# Patient Record
Sex: FEMALE | Race: WHITE | NOT HISPANIC OR LATINO | Employment: FULL TIME | URBAN - METROPOLITAN AREA
[De-identification: names, ages, dates, MRNs, and addresses within clinical notes are randomized per-mention and may not be internally consistent; named-entity substitution may affect disease eponyms.]

---

## 2017-01-12 RX ORDER — LEVOTHYROXINE SODIUM 0.07 MG/1
TABLET ORAL
Qty: 90 TAB | Refills: 0 | Status: SHIPPED | OUTPATIENT
Start: 2017-01-12 | End: 2017-03-13 | Stop reason: SDUPTHER

## 2017-01-12 RX ORDER — SIMVASTATIN 10 MG
TABLET ORAL
Qty: 90 TAB | Refills: 0 | Status: SHIPPED | OUTPATIENT
Start: 2017-01-12 | End: 2017-03-13 | Stop reason: SDUPTHER

## 2017-01-13 NOTE — TELEPHONE ENCOUNTER
Was the patient seen in the last year in this department? Yes     Does patient have an active prescription for medications requested? No     Received Request Via: Pharmacy      Pt met protocol?: Yes, OV 12/16, TSH 11/16 0.220, LIPID LABS 11/16

## 2017-01-13 NOTE — TELEPHONE ENCOUNTER
Last seen by PCP 12/16. Labs 11/16. TSH 0.220. Retesting in 3 months. Will send 3 months to pharmacy.

## 2017-02-07 RX ORDER — AMLODIPINE BESYLATE 5 MG/1
TABLET ORAL
Qty: 90 TAB | Refills: 1 | Status: SHIPPED | OUTPATIENT
Start: 2017-02-07 | End: 2017-08-08 | Stop reason: SDUPTHER

## 2017-02-07 NOTE — TELEPHONE ENCOUNTER
Refill X 6 months, sent to pharmacy.Pt. Seen in the last 6 months per protocol.   Lab Results   Component Value Date/Time    SODIUM 137 11/21/2016 10:12 AM    POTASSIUM 3.7 11/21/2016 10:12 AM    CHLORIDE 105 11/21/2016 10:12 AM    CO2 26 11/21/2016 10:12 AM    GLUCOSE 76 11/21/2016 10:12 AM    BUN 12 11/21/2016 10:12 AM    CREATININE 0.85 11/21/2016 10:12 AM

## 2017-02-07 NOTE — TELEPHONE ENCOUNTER
Was the patient seen in the last year in this department? Yes     Does patient have an active prescription for medications requested? No     Received Request Via: Pharmacy      Pt met protocol?: Yes pt last ov 12/2016   BP Readings from Last 1 Encounters:   12/10/16 120/78

## 2017-03-08 ENCOUNTER — OFFICE VISIT (OUTPATIENT)
Dept: MEDICAL GROUP | Facility: PHYSICIAN GROUP | Age: 52
End: 2017-03-08
Payer: COMMERCIAL

## 2017-03-08 VITALS
TEMPERATURE: 98.2 F | RESPIRATION RATE: 16 BRPM | WEIGHT: 155.6 LBS | BODY MASS INDEX: 31.37 KG/M2 | HEIGHT: 59 IN | HEART RATE: 72 BPM | SYSTOLIC BLOOD PRESSURE: 126 MMHG | OXYGEN SATURATION: 99 % | DIASTOLIC BLOOD PRESSURE: 80 MMHG

## 2017-03-08 DIAGNOSIS — Z12.31 ENCOUNTER FOR SCREENING MAMMOGRAM FOR BREAST CANCER: ICD-10-CM

## 2017-03-08 DIAGNOSIS — E66.9 OBESITY (BMI 35.0-39.9 WITHOUT COMORBIDITY): ICD-10-CM

## 2017-03-08 DIAGNOSIS — B00.1 HERPES LABIALIS: ICD-10-CM

## 2017-03-08 DIAGNOSIS — M25.441 SWELLING OF FINGER JOINT OF RIGHT HAND: ICD-10-CM

## 2017-03-08 PROCEDURE — 99214 OFFICE O/P EST MOD 30 MIN: CPT | Performed by: NURSE PRACTITIONER

## 2017-03-08 RX ORDER — FAMCICLOVIR 500 MG/1
500 TABLET ORAL 2 TIMES DAILY
Qty: 60 TAB | Refills: 5 | Status: SHIPPED | OUTPATIENT
Start: 2017-03-08

## 2017-03-08 NOTE — ASSESSMENT & PLAN NOTE
Patient reports swelling of the finger joint, right hand fifth digit. She had a pressure injury many years ago and developed arthritis. Last year she received a cortisone injection into the PIP joint and is requesting a cortisone injection again.

## 2017-03-08 NOTE — ASSESSMENT & PLAN NOTE
Patient reports having herpes labialis for many years. She was taking Famvir last breakout was 5 years ago. She had a recent breakout one week ago and is requesting refill on medication.

## 2017-03-08 NOTE — PATIENT INSTRUCTIONS
For adults, the American Heart Association recommends:    Consume a dietary pattern that emphasizes intake of vegetables, fruits, and whole grains; includes low-fat dairy products, poultry, fish, legumes, nontropical vegetable oils and nuts; and limits intake of sweets, sugar-sweetened beverages and red meats.  Look up DASH diet for more information.    Aim for a dietary pattern that achieves 5% to 6% of calories from saturated fat.     Reduce/eliminate percent of calories from trans fat     Consume no more than 2,400 mg of sodium/day     Engage in aerobic physical activity, 3 to 4 sessions a week, lasting on average 40 minutes per session, and involving moderate-to-vigorous intensity physical activity.

## 2017-03-08 NOTE — MR AVS SNAPSHOT
"        Drake Blakely   3/8/2017 10:20 AM   Office Visit   MRN: [id number]    Department:  [facility]   Dept Phone:  [phone]    Description:  Female : 1965   Provider:  JOSH Bach           Reason for Visit     Finger Pain Rt Pinky finger     Medication Refill Famvir       Allergies as of 3/8/2017     Allergen Noted Reactions    Erythromycin 2010   Swelling    Penicillins 2007   Hives, Rash, Swelling    Valtrex [Valacyclovir] 2016   Vomiting    Nausea, vomiting, and abd pain    Vicodin [Hydrocodone-Acetaminophen] 2014   Vomiting    Morphine 10/15/2009   Itching    Naprosyn [Naproxen] 10/15/2009   Nausea    Percocet [Oxycodone-Acetaminophen] 2011   Vomiting      You were diagnosed with     Obesity (BMI 35.0-39.9 without comorbidity) ([facility])   [852662]       Swelling of finger joint of right hand   [5117972]       Encounter for screening mammogram for breast cancer   [4029435]       Herpes labialis   [736526]         Vital Signs     Blood Pressure Pulse Temperature Respirations Height Weight    126/80 mmHg 72 36.8 °C (98.2 °F) 16 1.499 m (4' 11.02\") 70.58 kg (155 lb 9.6 oz)    Body Mass Index Oxygen Saturation Smoking Status             31.41 kg/m2 99% Former Smoker         Basic Information     Date Of Birth Sex Race Ethnicity Preferred Language    1965 Female White Non- English      Your appointments     Mar 13, 2017  9:50 AM   MA SCRN10 with [facility] MG 2   [facility] ([address])    [address] [address]   [phone]           No deodorant, powder, perfume or lotion under the arm or breast area.              Problem List              ICD-10-CM Priority Class Noted - Resolved    Allergic rhinitis J30.9   10/15/2009 - Present    HTN (hypertension) I10   10/15/2009 - Present    Seasonal allergies J30.2   Unknown - Present    CHEST PAIN    2013 - Present    Hypertension I10   " 8/23/2013 - Present    Hypothyroid E03.9   8/23/2013 - Present    Hyperlipidemia E78.5   8/23/2013 - Present    Mild intermittent asthma without complication J45.20   9/1/2016 - Present    Obesity (BMI 30-39.9) E66.9   9/1/2016 - Present    Insomnia G47.00   9/12/2016 - Present    Anxiety (Chronic) F41.9   9/1/2016 - Present    ROHAN (obstructive sleep apnea) G47.33   9/27/2016 - Present    Vitamin D deficiency E55.9   12/6/2016 - Present    Obesity (BMI 35.0-39.9 without comorbidity) (HCC) E66.9   3/8/2017 - Present    Swelling of finger joint of right hand M25.441   3/8/2017 - Present    Herpes labialis B00.1   3/8/2017 - Present      Health Maintenance        Date Due Completion Dates    PAP SMEAR 1/10/1986 ---    COLONOSCOPY 1/10/2015 ---    MAMMOGRAM 9/22/2015 9/22/2014, 9/16/2013, 4/26/2012, 3/14/2011, 3/20/2009, 3/20/2009, 3/19/2008, 3/19/2008, 2/5/2007    IMM INFLUENZA (1) 9/1/2016 ---    IMM DTaP/Tdap/Td Vaccine (3 - Td) 11/21/2026 11/21/2016, 10/25/2010            Current Immunizations     Pneumococcal polysaccharide vaccine (PPSV-23) 11/21/2016 10:33 AM    Tdap Vaccine 11/21/2016 10:31 AM, 10/25/2010      Below and/or attached are the medications your provider expects you to take. Review all of your home medications and newly ordered medications with your provider and/or pharmacist. Follow medication instructions as directed by your provider and/or pharmacist. Please keep your medication list with you and share with your provider. Update the information when medications are discontinued, doses are changed, or new medications (including over-the-counter products) are added; and carry medication information at all times in the event of emergency situations     Allergies:  ERYTHROMYCIN - Swelling     PENICILLINS - Hives,Rash,Swelling     VALTREX - Vomiting     VICODIN - Vomiting     MORPHINE - Itching     NAPROSYN - Nausea     PERCOCET - Vomiting               Medications  Valid as of: March 08, 2017 -  4:45  PM    Generic Name Brand Name Tablet Size Instructions for use    Albuterol Sulfate (Aero Soln) albuterol 108 (90 BASE) MCG/ACT Inhale 2 Puffs by mouth every 6 hours as needed. Shortness of breath        ALPRAZolam (Tab) XANAX 1 MG TAKE 1 TABLET BY MOUTH 3 TIMES DAILY AS NEEDED        AmLODIPine Besylate (Tab) NORVASC 5 MG Take 1 tablet by mouth  every day        Aspirin (Tablet Delayed Response) ECOTRIN 81 MG Take 81 mg by mouth every day.        Cetirizine HCl (Tab) ZYRTEC 10 MG Take 10 mg by mouth every bedtime.        Coenzyme Q10 (Cap) CoQ-10 200 MG Take  by mouth.        Escitalopram Oxalate (Tab) LEXAPRO 10 MG Take 20 mg by mouth every bedtime.        Famciclovir (Tab) FAMVIR 500 MG Take 1 Tab by mouth 2 times a day.        Garlic (Cap) Garlic 1000 MG Take  by mouth.        Levothyroxine Sodium (Tab) SYNTHROID 75 MCG Take 1 tablet by mouth  every morning before  breakfast        Oseltamivir Phosphate (Cap) TAMIFLU 75 MG Take 1 Cap by mouth 2 times a day.        Promethazine HCl (Tab) PHENERGAN 25 MG Take 1 Tab by mouth every 6 hours as needed for Nausea/Vomiting.        Simvastatin (Tab) ZOCOR 10 MG Take 1 tablet by mouth  daily        Zolpidem Tartrate (Tab) AMBIEN 5 MG Take 2.5 mg by mouth at bedtime as needed.        Zolpidem Tartrate (Tab) AMBIEN 10 MG TAKE 1 TABLET BY MOUTH AT BEDTIME AS NEEDED FOR INSOMNIA        .                 Medicines prescribed today were sent to:     [facility] #[id number] - [place] - [address]    [address] [facility] [place] [address]    Phone: [phone] Fax: [phone]    Open 24 Hours?: No    [facility] - [address]    [address] [facility] [address] [facility] [address]    Phone: [phone] Fax: [phone]    Open 24 Hours?: No      Medication refill instructions:       If your prescription bottle indicates you have medication refills left, it is not necessary to call your provider’s office. Please contact your pharmacy and they  will refill your medication.    If your prescription bottle indicates you do not have any refills left, you may request refills at any time through one of the following ways: The online [employer] system (except Urgent Care), by calling your provider’s office, or by asking your pharmacy to contact your provider’s office with a refill request. Medication refills are processed only during regular business hours and may not be available until the next business day. Your provider may request additional information or to have a follow-up visit with you prior to refilling your medication.   *Please Note: Medication refills are assigned a new Rx number when refilled electronically. Your pharmacy may indicate that no refills were authorized even though a new prescription for the same medication is available at the pharmacy. Please request the medicine by name with the pharmacy before contacting your provider for a refill.        Referral     A referral request has been sent to our patient care coordination department. Please allow 3-5 business days for us to process this request and contact you either by phone or mail. If you do not hear from us by the 5th business day, please call us at [phone].        Instructions    For adults, the American Heart Association recommends:    Consume a dietary pattern that emphasizes intake of vegetables, fruits, and whole grains; includes low-fat dairy products, poultry, fish, legumes, nontropical vegetable oils and nuts; and limits intake of sweets, sugar-sweetened beverages and red meats.  Look up DASH diet for more information.    Aim for a dietary pattern that achieves 5% to 6% of calories from saturated fat.     Reduce/eliminate percent of calories from trans fat     Consume no more than 2,400 mg of sodium/day     Engage in aerobic physical activity, 3 to 4 sessions a week, lasting on average 40 minutes per session, and involving moderate-to-vigorous intensity physical activity.              Present[employer] Access Code: Activation code not generated  Current Presenter[employer] Status: Active

## 2017-03-09 NOTE — PROGRESS NOTES
Subjective:     Chief Complaint   Patient presents with   • Finger Pain     Rt Pinky finger    • Medication Refill     Famvir        HPI:  Drake Blakely is a 52 y.o. female here today to discuss the following:    Herpes labialis  Patient reports having herpes labialis for many years. She was taking Famvir last breakout was 5 years ago. She had a recent breakout one week ago and is requesting refill on medication.    Swelling of finger joint of right hand  Patient reports swelling of the finger joint, right hand fifth digit. She had a pressure injury many years ago and developed arthritis. Last year she received a cortisone injection into the PIP joint and is requesting a cortisone injection again.         Current medicines (including changes today)  Current Outpatient Prescriptions   Medication Sig Dispense Refill   • famciclovir (FAMVIR) 500 MG Tab Take 1 Tab by mouth 2 times a day. 60 Tab 5   • amlodipine (NORVASC) 5 MG Tab Take 1 tablet by mouth  every day 90 Tab 1   • simvastatin (ZOCOR) 10 MG Tab Take 1 tablet by mouth  daily 90 Tab 0   • levothyroxine (SYNTHROID) 75 MCG Tab Take 1 tablet by mouth  every morning before  breakfast 90 Tab 0   • oseltamivir (TAMIFLU) 75 MG Cap Take 1 Cap by mouth 2 times a day. 10 Cap 0   • alprazolam (XANAX) 1 MG Tab TAKE 1 TABLET BY MOUTH 3 TIMES DAILY AS NEEDED 40 Tab 0   • zolpidem (AMBIEN) 10 MG Tab TAKE 1 TABLET BY MOUTH AT BEDTIME AS NEEDED FOR INSOMNIA 30 Tab 0   • promethazine (PHENERGAN) 25 MG Tab Take 1 Tab by mouth every 6 hours as needed for Nausea/Vomiting. 30 Tab 0   • Garlic 1000 MG CAPS Take  by mouth.     • Coenzyme Q10 (COQ-10) 200 MG CAPS Take  by mouth.     • cetirizine (ZYRTEC) 10 MG TABS Take 10 mg by mouth every bedtime.     • zolpidem (AMBIEN) 5 MG TABS Take 2.5 mg by mouth at bedtime as needed.     • aspirin EC (ECOTRIN) 81 MG TBEC Take 81 mg by mouth every day.     • albuterol (VENTOLIN OR PROVENTIL) 108 (90 BASE) MCG/ACT AERS Inhale 2 Puffs by mouth  "every 6 hours as needed. Shortness of breath     • escitalopram (LEXAPRO) 10 MG TABS Take 20 mg by mouth every bedtime.       No current facility-administered medications for this visit.       She  has a past medical history of Hypertension; Hyperlipidemia; Seasonal allergies; Anxiety; MI (myocardial infarction) ([facility]) (2008); Thyroid disease; Arthritis; Allergy; Asthma; and ROHAN (obstructive sleep apnea).    ROS   Review of Systems   Constitutional: Negative for fever, chills, weight loss and malaise/fatigue.   HENT: Negative for ear pain, nosebleeds, congestion, sore throat and neck pain.    Respiratory: Negative for cough, sputum production, shortness of breath and wheezing.    Cardiovascular: Negative for chest pain, palpitations,  and leg swelling.   Gastrointestinal: Negative for heartburn, nausea, vomiting, diarrhea and abdominal pain.   MS: Positive for finger swelling  Neurological: Negative for dizziness, tingling, tremors, sensory change, focal weakness and headaches.   Psychiatric/Behavioral: Negative for depression, anxiety, suicidal ideas, insomnia and memory loss.    All other systems reviewed and are negative except as in HPI.     Objective:   Physical Exam:  Blood pressure 126/80, pulse 72, temperature 36.8 °C (98.2 °F), resp. rate 16, height 1.499 m (4' 11.02\"), weight 70.58 kg (155 lb 9.6 oz), SpO2 99 %. Body mass index is 31.41 kg/(m^2).   Physical Exam:  Alert, oriented in no acute distress.  Eye contact is good, speech goal directed, affect calm  HEENT: conjunctiva non-injected, sclera non-icteric.  Pinna normal.   Neck No adenopathy or masses in the neck or supraclavicular regions.  Lungs: clear to auscultation bilaterally with good excursion.  CV: regular rate and rhythm.   Abdomen: soft, nontender, No CVAT. Normal bowel sounds.  Ext:  color normal, vascularity normal, temperature normal.  Right hand fifth digit PIP joint swelling and tenderness to palpation. Limited range of motion due to " pain.  MS: Normal gait and station    Assessment and Plan:   The following treatment plan was discussed   1. Obesity (BMI 35.0-39.9 without comorbidity) (HCC)  Patient identified as having weight management issue.  Appropriate orders and counseling given.   2. Swelling of finger joint of right hand  REFERRAL TO ORTHOPEDICS   3. Encounter for screening mammogram for breast cancer  MA-SCREEN MAMMO W/CAD-BILAT   4. Herpes labialis  famciclovir (FAMVIR) 500 MG Tab       Followup: Return if symptoms worsen or fail to improve.   Please note that this dictation was created using voice recognition software. I have made every reasonable attempt to correct obvious errors, but I expect that there are errors of grammar and possibly content that I did not discover before finalizing the note.

## 2017-03-13 ENCOUNTER — HOSPITAL ENCOUNTER (OUTPATIENT)
Dept: RADIOLOGY | Facility: MEDICAL CENTER | Age: 52
End: 2017-03-13
Attending: NURSE PRACTITIONER
Payer: COMMERCIAL

## 2017-03-13 PROCEDURE — 77063 BREAST TOMOSYNTHESIS BI: CPT

## 2017-03-13 RX ORDER — LEVOTHYROXINE SODIUM 0.07 MG/1
75 TABLET ORAL
Qty: 90 TAB | Refills: 0 | Status: SHIPPED | OUTPATIENT
Start: 2017-03-13 | End: 2017-05-15 | Stop reason: SDUPTHER

## 2017-03-13 RX ORDER — SIMVASTATIN 10 MG
10 TABLET ORAL EVERY EVENING
Qty: 90 TAB | Refills: 1 | Status: SHIPPED | OUTPATIENT
Start: 2017-03-13 | End: 2017-11-02 | Stop reason: SDUPTHER

## 2017-03-13 NOTE — TELEPHONE ENCOUNTER
Was the patient seen in the last year in this department? Yes     Does patient have an active prescription for medications requested? No     Received Request Via: Patient [facility]

## 2017-03-13 NOTE — TELEPHONE ENCOUNTER
Refilled levothyroxine x 3 months. Please advise patient to do thyroid labs for future fills.    Refilled simvastatin for 6 months. Last visit 3/17.  Lab Results   Component Value Date/Time    CHOLESTEROL, 11/21/2016 10:12 AM    LDL 70 11/21/2016 10:12 AM    HDL 87 11/21/2016 10:12 AM    TRIGLYCERIDES 79 11/21/2016 10:12 AM       Lab Results   Component Value Date/Time    SODIUM 137 11/21/2016 10:12 AM    POTASSIUM 3.7 11/21/2016 10:12 AM    CHLORIDE 105 11/21/2016 10:12 AM    CO2 26 11/21/2016 10:12 AM    GLUCOSE 76 11/21/2016 10:12 AM    BUN 12 11/21/2016 10:12 AM    CREATININE 0.85 11/21/2016 10:12 AM     Lab Results   Component Value Date/Time    ALKALINE PHOSPHATASE 76 11/21/2016 10:12 AM    AST(SGOT) 21 11/21/2016 10:12 AM    ALT(SGPT) 15 11/21/2016 10:12 AM    TOTAL BILIRUBIN 0.8 11/21/2016 10:12 AM

## 2017-03-13 NOTE — TELEPHONE ENCOUNTER
From: Drake Blakely  To: JOSH Bach  Sent: 3/12/2017 10:17 PM PDT  Subject: Medication Renewal Request    Original authorizing provider: JOSH Bach would like a refill of the following medications:  simvastatin (ZOCOR) 10 MG Tab [JOSH Bach]  levothyroxine (SYNTHROID) 75 MCG Tab [OJSH Bach]    Preferred pharmacy: [facility] - [address]    Comment:

## 2017-03-13 NOTE — TELEPHONE ENCOUNTER
Pt met protocol?: Yes pt last ov 3/8/17 last TSH+lipid lab 11/2016   Lab Results   Component Value Date/Time    HDL 87 11/21/2016 10:12 AM

## 2017-04-24 ENCOUNTER — SLEEP CENTER VISIT (OUTPATIENT)
Dept: SLEEP MEDICINE | Facility: MEDICAL CENTER | Age: 52
End: 2017-04-24
Payer: COMMERCIAL

## 2017-04-24 VITALS
BODY MASS INDEX: 31.85 KG/M2 | WEIGHT: 158 LBS | SYSTOLIC BLOOD PRESSURE: 122 MMHG | HEIGHT: 59 IN | HEART RATE: 68 BPM | TEMPERATURE: 97.5 F | OXYGEN SATURATION: 98 % | RESPIRATION RATE: 16 BRPM | DIASTOLIC BLOOD PRESSURE: 70 MMHG

## 2017-04-24 DIAGNOSIS — J30.9 ALLERGIC RHINITIS, UNSPECIFIED ALLERGIC RHINITIS TRIGGER, UNSPECIFIED RHINITIS SEASONALITY: ICD-10-CM

## 2017-04-24 DIAGNOSIS — E66.9 OBESITY (BMI 30-39.9): ICD-10-CM

## 2017-04-24 DIAGNOSIS — G47.33 OSA (OBSTRUCTIVE SLEEP APNEA): ICD-10-CM

## 2017-04-24 PROCEDURE — 99213 OFFICE O/P EST LOW 20 MIN: CPT | Performed by: NURSE PRACTITIONER

## 2017-04-24 NOTE — PROGRESS NOTES
Chief Complaint   Patient presents with   • Apnea     CPAP 8       HPI:  Drake Blakely is a 52 y.o. year old female here today for follow-up on his obstructive sleep apnea. Prior polysomnogram had shown mild obstructive sleep apnea with an AHI of 9.5. She is compliant on CPAP at 8 CM H2O. Compliance card download indicates an AHI of 3.8 with an average use of 10 hours at night. She tolerates her CPAP pressure well. She sleeps better at night and energy levels have improved. She denies any morning headaches. She has a full face mask which she feels is comfortable. She does have a history of Asthma and environmental allergies which has been well controlled. She states her Asthma has been managed by her PCP. She notes clear sinus drainage. She is using OTC Flonase nasal spray and Zyrtec.       Past Medical History   Diagnosis Date   • Hypertension    • Hyperlipidemia    • Seasonal allergies    • Anxiety    • MI (myocardial infarction) ([facility]) 2008   • Thyroid disease    • Arthritis    • Allergy    • Asthma    • ROHAN (obstructive sleep apnea)      cpap 8 cm       Past Surgical History   Procedure Laterality Date   • Hysterectomy, vaginal  2007   • Breast biopsy Right 2005     hx of right benign biopsy-no scar   • Inguinal hernia repair Left 2007     same time as hysterectomy       Family History   Problem Relation Age of Onset   • Cancer Mother 57     lung cancer   • Thyroid Mother    • Lung Disease Father    • Heart Disease Father    • Diabetes Father    • Hypertension Father    • Asthma Father    • Lung Disease Sister      asthma   • Hypertension Sister    • Heart Disease Sister    • Anxiety disorder Sister    • Asthma Sister        Social History     Social History   • Marital Status: Single     Spouse Name: N/A   • Number of Children: N/A   • Years of Education: N/A     Occupational History   • Not on file.     Social History Main Topics   • Smoking status: Former Smoker -- 0.50 packs/day for 4 years     Types:  Cigarettes     Quit date: 09/01/1985   • Smokeless tobacco: Never Used   • Alcohol Use: 1.2 oz/week     0 Standard drinks or equivalent, 2 Shots of liquor per week   • Drug Use: Yes     Special: Methamphetamines, Cocaine, Marijuana      Comment: Past history   • Sexual Activity:     Partners: Female     Other Topics Concern   • Not on file     Social History Narrative         ROS:  Constitutional: Denies fevers, chills, sweats, fatigue, weight loss  Eyes: Denies vision loss, pain, drainage, double vision  Ears/Nose/Mouth/Throat: Denies ear ache, difficulty hearing, sore throat, persistent hoarseness, decayed teeth/toothache  Cardiovascular: Denies chest pain, tightness, palpitations, swelling in feet/legs, fainting, difficulty breathing when laying down  Respiratory: Denies shortness of breath, cough, sputum, wheezing, painful breathing, coughing up blood  GI: Denies heartburn, difficulty swallowing, nausea, vomiting, abdominal pain, diarrhea, constipation  : Denies frequent urination, painful urination  Integumentary: Denies rashes, lumps or color changes  MSK: Denies painful joints, sore muscles, and back pain.   Neurological: Denies frequent headaches, dizziness, weakness  Sleep: See HPI       Current Outpatient Prescriptions on File Prior to Visit   Medication Sig Dispense Refill   • simvastatin (ZOCOR) 10 MG Tab Take 1 Tab by mouth every evening. 90 Tab 1   • levothyroxine (SYNTHROID) 75 MCG Tab Take 1 Tab by mouth Every morning on an empty stomach. 90 Tab 0   • famciclovir (FAMVIR) 500 MG Tab Take 1 Tab by mouth 2 times a day. 60 Tab 5   • amlodipine (NORVASC) 5 MG Tab Take 1 tablet by mouth  every day 90 Tab 1   • oseltamivir (TAMIFLU) 75 MG Cap Take 1 Cap by mouth 2 times a day. 10 Cap 0   • Garlic 1000 MG CAPS Take  by mouth.     • Coenzyme Q10 (COQ-10) 200 MG CAPS Take  by mouth.     • cetirizine (ZYRTEC) 10 MG TABS Take 10 mg by mouth every bedtime.     • zolpidem (AMBIEN) 5 MG TABS Take 2.5 mg by mouth  "at bedtime as needed.     • aspirin EC (ECOTRIN) 81 MG TBEC Take 81 mg by mouth every day.     • escitalopram (LEXAPRO) 10 MG TABS Take 20 mg by mouth every bedtime.     • alprazolam (XANAX) 1 MG Tab TAKE 1 TABLET BY MOUTH 3 TIMES DAILY AS NEEDED 40 Tab 0   • zolpidem (AMBIEN) 10 MG Tab TAKE 1 TABLET BY MOUTH AT BEDTIME AS NEEDED FOR INSOMNIA 30 Tab 0   • promethazine (PHENERGAN) 25 MG Tab Take 1 Tab by mouth every 6 hours as needed for Nausea/Vomiting. 30 Tab 0   • albuterol (VENTOLIN OR PROVENTIL) 108 (90 BASE) MCG/ACT AERS Inhale 2 Puffs by mouth every 6 hours as needed. Shortness of breath       No current facility-administered medications on file prior to visit.     Erythromycin; Penicillins; Valtrex; Vicodin; Morphine; Naprosyn; and Percocet    Blood pressure 122/70, pulse 68, temperature 36.4 °C (97.5 °F), resp. rate 16, height 1.499 m (4' 11\"), weight 71.668 kg (158 lb), SpO2 98 %.  PE:   Appearance: Well developed, well nourished, no acute distress  Eyes: PERRL, EOM intact, sclera white, conjunctiva moist  Ears: no lesions or deformities  Hearing: grossly intact  Nose: no lesions or deformities  Oropharynx: tongue normal, posterior pharynx without erythema or exudate  Mallampati Classification: class 2   Neck: supple, trachea midline, no masses   Respiratory effort: no intercostal retractions or use of accessory muscles  Lung auscultation: no rales, rhonchi or wheezes  Heart auscultation: no murmur rub or gallop  Extremities: no cyanosis or edema  Abdomen: soft ,non tender, no masses  Gait and Station: normal  Digits and nails: no clubbing, cyanosis, petechiae or nodes.  Cranial nerves: grossly intact  Skin: no rashes, lesions or ulcers noted  Orientation: Oriented to time, person and place  Mood and affect: mood and affect appropriate, normal interaction with examiner  Judgement: Intact          Assessment:  1. ROHAN (obstructive sleep apnea)  DME MASK AND SUPPLIES   2. Obesity (BMI 30-39.9)     3. Allergic " rhinitis, unspecified allergic rhinitis trigger, unspecified rhinitis seasonality           Plan:    1) Continue CPAP at 8 CM H20.    2) Sleep hygiene discussed.    3) RX for mask and supplies sent to her DME.    4) Continue Flonase nasal spray and Zyrtec OTC as needed. Recommend adding saline sinus rinse. Sample of [employer]ov[employer] Med saline sinus rinse provided.   5) Annual follow up, sooner if needed.

## 2017-04-24 NOTE — MR AVS SNAPSHOT
"        Drake Blakely   2017 9:00 AM   Sleep Center Visit   MRN: [id number]    Department:  [facility]   Dept Phone:  [phone]    Description:  Female : 1965   Provider:  BETY Oneill           Reason for Visit     Apnea CPAP 8      Allergies as of 2017     Allergen Noted Reactions    Erythromycin 2010   Swelling    Penicillins 2007   Hives, Rash, Swelling    Valtrex [Valacyclovir] 2016   Vomiting    Nausea, vomiting, and abd pain    Vicodin [Hydrocodone-Acetaminophen] 2014   Vomiting    Morphine 10/15/2009   Itching    Naprosyn [Naproxen] 10/15/2009   Nausea    Percocet [Oxycodone-Acetaminophen] 2011   Vomiting      You were diagnosed with     ROHAN (obstructive sleep apnea)   [560368]       Obesity (BMI 30-39.9)   [714890]       Allergic rhinitis, unspecified allergic rhinitis trigger, unspecified rhinitis seasonality   [1628566]         Vital Signs     Blood Pressure Pulse Temperature Respirations Height Weight    122/70 mmHg 68 36.4 °C (97.5 °F) 16 1.499 m (4' 11\") 71.668 kg (158 lb)    Body Mass Index Oxygen Saturation Smoking Status             31.89 kg/m2 98% Former Smoker         Basic Information     Date Of Birth Sex Race Ethnicity Preferred Language    1965 Female White Non- English      Your appointments     May 07, 2018  8:00 AM   Follow UP with BETY Oneill   [facility] (--)    [address] [place] [id number] [phone]              Problem List              ICD-10-CM Priority Class Noted - Resolved    Allergic rhinitis J30.9   10/15/2009 - Present    HTN (hypertension) I10   10/15/2009 - Present    Seasonal allergies J30.2   Unknown - Present    CHEST PAIN    2013 - Present    Hypertension I10   2013 - Present    Hypothyroid E03.9   2013 - Present    Hyperlipidemia E78.5   2013 - Present    Mild intermittent asthma without complication " J45.20   9/1/2016 - Present    Obesity (BMI 30-39.9) E66.9   9/1/2016 - Present    Insomnia G47.00   9/12/2016 - Present    Anxiety (Chronic) F41.9   9/1/2016 - Present    ROHAN (obstructive sleep apnea) G47.33   9/27/2016 - Present    Vitamin D deficiency E55.9   12/6/2016 - Present    Obesity (BMI 35.0-39.9 without comorbidity) (HCC) E66.9   3/8/2017 - Present    Swelling of finger joint of right hand M25.441   3/8/2017 - Present    Herpes labialis B00.1   3/8/2017 - Present      Health Maintenance        Date Due Completion Dates    PAP SMEAR 1/10/1986 ---    COLONOSCOPY 1/10/2015 ---    MAMMOGRAM 3/13/2018 3/13/2017, 9/22/2014, 9/16/2013, 4/26/2012, 3/14/2011, 3/20/2009, 3/20/2009, 3/19/2008, 3/19/2008, 2/5/2007    IMM DTaP/Tdap/Td Vaccine (3 - Td) 11/21/2026 11/21/2016, 10/25/2010            Current Immunizations     Pneumococcal polysaccharide vaccine (PPSV-23) 11/21/2016 10:33 AM    Tdap Vaccine 11/21/2016 10:31 AM, 10/25/2010      Below and/or attached are the medications your provider expects you to take. Review all of your home medications and newly ordered medications with your provider and/or pharmacist. Follow medication instructions as directed by your provider and/or pharmacist. Please keep your medication list with you and share with your provider. Update the information when medications are discontinued, doses are changed, or new medications (including over-the-counter products) are added; and carry medication information at all times in the event of emergency situations     Allergies:  ERYTHROMYCIN - Swelling     PENICILLINS - Hives,Rash,Swelling     VALTREX - Vomiting     VICODIN - Vomiting     MORPHINE - Itching     NAPROSYN - Nausea     PERCOCET - Vomiting               Medications  Valid as of: April 24, 2017 -  9:30 AM    Generic Name Brand Name Tablet Size Instructions for use    Albuterol Sulfate (Aero Soln) albuterol 108 (90 BASE) MCG/ACT Inhale 2 Puffs by mouth every 6 hours as needed.  Shortness of breath        ALPRAZolam (Tab) XANAX 1 MG TAKE 1 TABLET BY MOUTH 3 TIMES DAILY AS NEEDED        AmLODIPine Besylate (Tab) NORVASC 5 MG Take 1 tablet by mouth  every day        Aspirin (Tablet Delayed Response) ECOTRIN 81 MG Take 81 mg by mouth every day.        Cetirizine HCl (Tab) ZYRTEC 10 MG Take 10 mg by mouth every bedtime.        Coenzyme Q10 (Cap) CoQ-10 200 MG Take  by mouth.        Escitalopram Oxalate (Tab) LEXAPRO 10 MG Take 20 mg by mouth every bedtime.        Famciclovir (Tab) FAMVIR 500 MG Take 1 Tab by mouth 2 times a day.        Garlic (Cap) Garlic 1000 MG Take  by mouth.        Levothyroxine Sodium (Tab) SYNTHROID 75 MCG Take 1 Tab by mouth Every morning on an empty stomach.        Oseltamivir Phosphate (Cap) TAMIFLU 75 MG Take 1 Cap by mouth 2 times a day.        Promethazine HCl (Tab) PHENERGAN 25 MG Take 1 Tab by mouth every 6 hours as needed for Nausea/Vomiting.        Simvastatin (Tab) ZOCOR 10 MG Take 1 Tab by mouth every evening.        Zolpidem Tartrate (Tab) AMBIEN 5 MG Take 2.5 mg by mouth at bedtime as needed.        Zolpidem Tartrate (Tab) AMBIEN 10 MG TAKE 1 TABLET BY MOUTH AT BEDTIME AS NEEDED FOR INSOMNIA        .                 Medicines prescribed today were sent to:     [facility]/PHARMACY #[id number] - [place], [place] - [address]    [address] [facility] [place] [address]    Phone: [phone] Fax: [phone]    Open 24 Hours?: No    [facility] - [address] [facility] [address]    Phone: [phone] Fax: [phone]    Open 24 Hours?: No      Medication refill instructions:       If your prescription bottle indicates you have medication refills left, it is not necessary to call your provider’s office. Please contact your pharmacy and they will refill your medication.    If your prescription bottle indicates you do not have any refills left, you may request refills at any time through one of the following ways:  The online [employer] system (except Urgent Care), by calling your provider’s office, or by asking your pharmacy to contact your provider’s office with a refill request. Medication refills are processed only during regular business hours and may not be available until the next business day. Your provider may request additional information or to have a follow-up visit with you prior to refilling your medication.   *Please Note: Medication refills are assigned a new Rx number when refilled electronically. Your pharmacy may indicate that no refills were authorized even though a new prescription for the same medication is available at the pharmacy. Please request the medicine by name with the pharmacy before contacting your provider for a refill.           [employer] Access Code: Activation code not generated  Current [employer] Status: Active

## 2017-04-24 NOTE — PATIENT INSTRUCTIONS
Plan:    1) Continue CPAP at 8 CM H20.    2) Sleep hygiene discussed.    3) RX for mask and supplies sent to her DME.    4) Continue Flonase nasal spray and Zyrtec OTC as needed. Recommend adding saline sinus rinse. Sample of [employer] saline sinus rinse provided.   5) Annual follow up, sooner if needed.

## 2017-05-04 RX ORDER — ESCITALOPRAM OXALATE 10 MG/1
20 TABLET ORAL
Qty: 180 TAB | Refills: 0 | Status: SHIPPED | OUTPATIENT
Start: 2017-05-04 | End: 2018-01-04 | Stop reason: SDUPTHER

## 2017-05-04 NOTE — TELEPHONE ENCOUNTER
Was the patient seen in the last year in this department? Yes     Does patient have an active prescription for medications requested? No     Received Request Via: Pharmacy      Pt met protocol?: Yes, OV 3/17

## 2017-05-16 RX ORDER — LEVOTHYROXINE SODIUM 0.07 MG/1
TABLET ORAL
Qty: 90 TAB | Refills: 0 | Status: SHIPPED | OUTPATIENT
Start: 2017-05-16 | End: 2017-08-04 | Stop reason: SDUPTHER

## 2017-05-16 NOTE — TELEPHONE ENCOUNTER
Was the patient seen in the last year in this department? Yes     Does patient have an active prescription for medications requested? No     Received Request Via: Pharmacy      Pt met protocol?: Yes, OV 3/17, TSH checked 11/16 (low)

## 2017-06-30 DIAGNOSIS — J45.20 MILD INTERMITTENT ASTHMA WITHOUT COMPLICATION: ICD-10-CM

## 2017-06-30 RX ORDER — ALBUTEROL SULFATE 90 UG/1
2 AEROSOL, METERED RESPIRATORY (INHALATION) EVERY 6 HOURS PRN
Qty: 8.5 G | Refills: 2 | Status: SHIPPED | OUTPATIENT
Start: 2017-06-30 | End: 2018-01-13 | Stop reason: SDUPTHER

## 2017-08-04 DIAGNOSIS — E78.5 HYPERLIPIDEMIA, UNSPECIFIED HYPERLIPIDEMIA TYPE: ICD-10-CM

## 2017-08-04 DIAGNOSIS — E03.9 ACQUIRED HYPOTHYROIDISM: ICD-10-CM

## 2017-08-04 DIAGNOSIS — I10 ESSENTIAL HYPERTENSION: ICD-10-CM

## 2017-08-05 RX ORDER — LEVOTHYROXINE SODIUM 0.07 MG/1
TABLET ORAL
Qty: 90 TAB | Refills: 0 | Status: SHIPPED | OUTPATIENT
Start: 2017-08-05 | End: 2017-10-24 | Stop reason: SDUPTHER

## 2017-08-06 ENCOUNTER — OFFICE VISIT (OUTPATIENT)
Dept: URGENT CARE | Facility: CLINIC | Age: 52
End: 2017-08-06
Payer: COMMERCIAL

## 2017-08-06 VITALS
RESPIRATION RATE: 16 BRPM | HEART RATE: 64 BPM | HEIGHT: 59 IN | SYSTOLIC BLOOD PRESSURE: 110 MMHG | BODY MASS INDEX: 31.45 KG/M2 | WEIGHT: 156 LBS | TEMPERATURE: 98.1 F | DIASTOLIC BLOOD PRESSURE: 74 MMHG | OXYGEN SATURATION: 99 %

## 2017-08-06 DIAGNOSIS — K11.20 PAROTITIS: ICD-10-CM

## 2017-08-06 DIAGNOSIS — I88.9 LYMPHADENITIS: ICD-10-CM

## 2017-08-06 PROCEDURE — 99214 OFFICE O/P EST MOD 30 MIN: CPT | Performed by: NURSE PRACTITIONER

## 2017-08-06 RX ORDER — CLINDAMYCIN HYDROCHLORIDE 300 MG/1
300 CAPSULE ORAL 3 TIMES DAILY
Qty: 30 CAP | Refills: 0 | Status: SHIPPED | OUTPATIENT
Start: 2017-08-06 | End: 2017-08-16

## 2017-08-06 ASSESSMENT — ENCOUNTER SYMPTOMS
FEVER: 0
RESPIRATORY NEGATIVE: 1
SORE THROAT: 0
NAUSEA: 0
MYALGIAS: 0
HEADACHES: 0
CHILLS: 0
EYES NEGATIVE: 1
VOMITING: 0

## 2017-08-06 NOTE — PROGRESS NOTES
Subjective:      Drake Blakely is a 52 y.o. female who presents with Pharyngitis    Past Medical History   Diagnosis Date   • Hypertension    • Hyperlipidemia    • Seasonal allergies    • Anxiety    • MI (myocardial infarction) ([facility]) 2008   • Thyroid disease    • Arthritis    • Allergy    • Asthma    • ROHAN (obstructive sleep apnea)      cpap 8 cm     Social History     Social History   • Marital Status: Single     Spouse Name: N/A   • Number of Children: N/A   • Years of Education: N/A     Occupational History   • Not on file.     Social History Main Topics   • Smoking status: Former Smoker -- 0.50 packs/day for 4 years     Types: Cigarettes     Quit date: 09/01/1985   • Smokeless tobacco: Never Used   • Alcohol Use: 1.2 oz/week     0 Standard drinks or equivalent, 2 Shots of liquor per week   • Drug Use: Yes     Special: Methamphetamines, Cocaine, Marijuana      Comment: Past history   • Sexual Activity:     Partners: Female     Other Topics Concern   • Not on file     Social History Narrative     Family History   Problem Relation Age of Onset   • Cancer Mother 57     lung cancer   • Thyroid Mother    • Lung Disease Father    • Heart Disease Father    • Diabetes Father    • Hypertension Father    • Asthma Father    • Lung Disease Sister      asthma   • Hypertension Sister    • Heart Disease Sister    • Anxiety disorder Sister    • Asthma Sister        Allergies: Erythromycin; Penicillins; Valtrex; Vicodin; Morphine; Naprosyn; and Percocet            Pharyngitis   This is a new problem. The current episode started in the past 7 days. The problem has been waxing and waning. The pain is worse on the left side. There has been no fever. Associated symptoms include congestion. Pertinent negatives include no ear pain, headaches or vomiting. Associated symptoms comments: Jaw pain, left anterior neck tenderness. She has tried nothing for the symptoms. The treatment provided no relief.       Review of Systems  "  Constitutional: Negative for fever, chills and malaise/fatigue.   HENT: Positive for congestion. Negative for ear pain, sore throat and tinnitus.         Yellow nasal drainage   Eyes: Negative.    Respiratory: Negative.    Gastrointestinal: Negative for nausea and vomiting.   Musculoskeletal: Negative for myalgias.   Skin: Negative.  Negative for itching and rash.   Neurological: Negative for headaches.       All other systems reviewed and are negative      Objective:     /74 mmHg  Pulse 64  Temp(Src) 36.7 °C (98.1 °F)  Resp 16  Ht 1.499 m (4' 11\")  Wt 70.761 kg (156 lb)  BMI 31.49 kg/m2  SpO2 99%  Breastfeeding? No     Physical Exam   Constitutional: She is oriented to person, place, and time. She appears well-developed and well-nourished. No distress.   Neck:       Point tenderness over the left parotic and left anterior cervical lymph nodes.  There is lymphadenitis present and mild swelling over the partoid gland.   Neurological: She is alert and oriented to person, place, and time.   Skin: Skin is warm and dry. She is not diaphoretic.   Psychiatric: She has a normal mood and affect. Her behavior is normal.   Vitals reviewed.              Assessment/Plan:     1. Parotitis    - clindamycin (CLEOCIN) 300 MG Cap; Take 1 Cap by mouth 3 times a day for 10 days.  Dispense: 30 Cap; Refill: 0  -warm compresses  -tylenol/motrin PRN  -ER precautions: increasing swelling, pain, fever >100.5   -Teaching done re: risk of C.diff with clindamycin; patient advised to also take probiotic and return for evaluation for any persistent diarrhea that develops within the next 90 days.    2. Lymphadenitis    - clindamycin (CLEOCIN) 300 MG Cap; Take 1 Cap by mouth 3 times a day for 10 days.  Dispense: 30 Cap; Refill: 0  -warm compresses  -tylenol/motrin PRN  -ER precautions: increasing swelling, pain, fever >100.5   -Teaching done re: risk of C.diff with clindamycin; patient advised to also take probiotic and return for " evaluation for any persistent diarrhea that develops within the next 90 days.

## 2017-08-06 NOTE — MR AVS SNAPSHOT
"        Drake Blakely   2017 12:00 PM   Office Visit   MRN: [id number]    Department:  [facility]   Dept Phone:  [phone]    Description:  Female : 1965   Provider:  Cathey J Hamman, A.PMARLIN.           Reason for Visit     Pharyngitis x1 week sore throat congestion      Allergies as of 2017     Allergen Noted Reactions    Erythromycin 2010   Swelling    Penicillins 2007   Hives, Rash, Swelling    Valtrex [Valacyclovir] 2016   Vomiting    Nausea, vomiting, and abd pain    Vicodin [Hydrocodone-Acetaminophen] 2014   Vomiting    Morphine 10/15/2009   Itching    Naprosyn [Naproxen] 10/15/2009   Nausea    Percocet [Oxycodone-Acetaminophen] 2011   Vomiting      You were diagnosed with     Parotitis   [469199]       Lymphadenitis   [045371]         Vital Signs     Blood Pressure Pulse Temperature Respirations Height Weight    110/74 mmHg 64 36.7 °C (98.1 °F) 16 1.499 m (4' 11\") 70.761 kg (156 lb)    Body Mass Index Oxygen Saturation Breastfeeding? Smoking Status          31.49 kg/m2 99% No Former Smoker        Basic Information     Date Of Birth Sex Race Ethnicity Preferred Language    1965 Female White Non- English      Your appointments     May 07, 2018  8:00 AM   Follow UP with JOE OneillPSHANICE   [facility] (--)    [address] [place] [id number] [phone]              Problem List              ICD-10-CM Priority Class Noted - Resolved    Allergic rhinitis J30.9   10/15/2009 - Present    HTN (hypertension) I10   10/15/2009 - Present    Seasonal allergies J30.2   Unknown - Present    CHEST PAIN    2013 - Present    Hypertension I10   2013 - Present    Hypothyroid E03.9   2013 - Present    Hyperlipidemia E78.5   2013 - Present    Mild intermittent asthma without complication J45.20   2016 - Present    Obesity (BMI 30-39.9) E66.9   2016 - Present    Insomnia G47.00   " 9/12/2016 - Present    Anxiety (Chronic) F41.9   9/1/2016 - Present    ROHAN (obstructive sleep apnea) G47.33   9/27/2016 - Present    Vitamin D deficiency E55.9   12/6/2016 - Present    Obesity (BMI 35.0-39.9 without comorbidity) (HCC) E66.9   3/8/2017 - Present    Swelling of finger joint of right hand M25.441   3/8/2017 - Present    Herpes labialis B00.1   3/8/2017 - Present      Health Maintenance        Date Due Completion Dates    PAP SMEAR 1/10/1986 ---    COLONOSCOPY 1/10/2015 ---    IMM INFLUENZA (1) 9/1/2017 ---    MAMMOGRAM 3/13/2018 3/13/2017, 9/22/2014, 9/16/2013, 4/26/2012, 3/14/2011, 3/20/2009, 3/20/2009, 3/19/2008, 3/19/2008, 2/5/2007    IMM DTaP/Tdap/Td Vaccine (3 - Td) 11/21/2026 11/21/2016, 10/25/2010            Current Immunizations     Pneumococcal polysaccharide vaccine (PPSV-23) 11/21/2016 10:33 AM    Tdap Vaccine 11/21/2016 10:31 AM, 10/25/2010      Below and/or attached are the medications your provider expects you to take. Review all of your home medications and newly ordered medications with your provider and/or pharmacist. Follow medication instructions as directed by your provider and/or pharmacist. Please keep your medication list with you and share with your provider. Update the information when medications are discontinued, doses are changed, or new medications (including over-the-counter products) are added; and carry medication information at all times in the event of emergency situations     Allergies:  ERYTHROMYCIN - Swelling     PENICILLINS - Hives,Rash,Swelling     VALTREX - Vomiting     VICODIN - Vomiting     MORPHINE - Itching     NAPROSYN - Nausea     PERCOCET - Vomiting               Medications  Valid as of: August 06, 2017 -  1:10 PM    Generic Name Brand Name Tablet Size Instructions for use    Albuterol Sulfate (Aero Soln) albuterol 108 (90 BASE) MCG/ACT Inhale 2 Puffs by mouth every 6 hours as needed. Shortness of breath        ALPRAZolam (Tab) XANAX 1 MG TAKE 1 TABLET BY  MOUTH 3 TIMES DAILY AS NEEDED        AmLODIPine Besylate (Tab) NORVASC 5 MG Take 1 tablet by mouth  every day        Aspirin (Tablet Delayed Response) ECOTRIN 81 MG Take 81 mg by mouth every day.        Cetirizine HCl (Tab) ZYRTEC 10 MG Take 10 mg by mouth every bedtime.        Clindamycin HCl (Cap) CLEOCIN 300 MG Take 1 Cap by mouth 3 times a day for 10 days.        Coenzyme Q10 (Cap) CoQ-10 200 MG Take  by mouth.        Escitalopram Oxalate (Tab) LEXAPRO 10 MG Take 2 Tabs by mouth every bedtime.        Famciclovir (Tab) FAMVIR 500 MG Take 1 Tab by mouth 2 times a day.        Garlic (Cap) Garlic 1000 MG Take  by mouth.        Levothyroxine Sodium (Tab) SYNTHROID 75 MCG Take 1 tablet by mouth  every morning before  breakfast        Oseltamivir Phosphate (Cap) TAMIFLU 75 MG Take 1 Cap by mouth 2 times a day.        Promethazine HCl (Tab) PHENERGAN 25 MG Take 1 Tab by mouth every 6 hours as needed for Nausea/Vomiting.        Simvastatin (Tab) ZOCOR 10 MG Take 1 Tab by mouth every evening.        Zolpidem Tartrate (Tab) AMBIEN 5 MG Take 2.5 mg by mouth at bedtime as needed.        Zolpidem Tartrate (Tab) AMBIEN 10 MG TAKE 1 TABLET BY MOUTH AT BEDTIME AS NEEDED FOR INSOMNIA        .                 Medicines prescribed today were sent to:     [facility]/PHARMACY #[id number] - [place], [place] - [address]    [address] [facility] [address]    Phone: [phone] Fax: [phone]    Open 24 Hours?: No    [facility] - [address] [place] [address]    Phone: [phone] Fax: [phone]    Open 24 Hours?: No      Medication refill instructions:       If your prescription bottle indicates you have medication refills left, it is not necessary to call your provider’s office. Please contact your pharmacy and they will refill your medication.    If your prescription bottle indicates you do not have any refills left, you may request refills at any time through one  of the following ways: The online "[employer] system (except Urgent Care), by calling your provider’s office, or by asking your pharmacy to contact your provider’s office with a refill request. Medication refills are processed only during regular business hours and may not be available until the next business day. Your provider may request additional information or to have a follow-up visit with you prior to refilling your medication.   *Please Note: Medication refills are assigned a new Rx number when refilled electronically. Your pharmacy may indicate that no refills were authorized even though a new prescription for the same medication is available at the pharmacy. Please request the medicine by name with the pharmacy before contacting your provider for a refill.           "[employer] Access Code: Activation code not generated  Current "[employer] Status: Active

## 2017-08-09 RX ORDER — AMLODIPINE BESYLATE 5 MG/1
TABLET ORAL
Qty: 90 TAB | Refills: 1 | Status: SHIPPED | OUTPATIENT
Start: 2017-08-09 | End: 2018-01-04 | Stop reason: SDUPTHER

## 2017-08-09 NOTE — TELEPHONE ENCOUNTER
Was the patient seen in the last year in this department? Yes     Does patient have an active prescription for medications requested? No     Received Request Via: Pharmacy      Pt met protocol?: Yes    LAST OV 03/08/2017    BP Readings from Last 1 Encounters:   08/06/17 110/74

## 2017-10-05 ENCOUNTER — OFFICE VISIT (OUTPATIENT)
Dept: MEDICAL GROUP | Facility: PHYSICIAN GROUP | Age: 52
End: 2017-10-05
Payer: COMMERCIAL

## 2017-10-05 VITALS
HEIGHT: 59 IN | OXYGEN SATURATION: 100 % | SYSTOLIC BLOOD PRESSURE: 130 MMHG | DIASTOLIC BLOOD PRESSURE: 80 MMHG | TEMPERATURE: 97.9 F | HEART RATE: 62 BPM | WEIGHT: 159.6 LBS | RESPIRATION RATE: 14 BRPM | BODY MASS INDEX: 32.17 KG/M2

## 2017-10-05 DIAGNOSIS — E78.5 HYPERLIPIDEMIA, UNSPECIFIED HYPERLIPIDEMIA TYPE: Chronic | ICD-10-CM

## 2017-10-05 DIAGNOSIS — E55.9 VITAMIN D DEFICIENCY: ICD-10-CM

## 2017-10-05 DIAGNOSIS — J30.9 ALLERGIC RHINITIS, UNSPECIFIED CHRONICITY, UNSPECIFIED SEASONALITY, UNSPECIFIED TRIGGER: ICD-10-CM

## 2017-10-05 DIAGNOSIS — E03.9 ACQUIRED HYPOTHYROIDISM: Chronic | ICD-10-CM

## 2017-10-05 DIAGNOSIS — I10 ESSENTIAL HYPERTENSION: Chronic | ICD-10-CM

## 2017-10-05 DIAGNOSIS — Z23 NEED FOR VACCINATION: ICD-10-CM

## 2017-10-05 PROCEDURE — 90471 IMMUNIZATION ADMIN: CPT | Performed by: NURSE PRACTITIONER

## 2017-10-05 PROCEDURE — 99214 OFFICE O/P EST MOD 30 MIN: CPT | Mod: 25 | Performed by: NURSE PRACTITIONER

## 2017-10-05 PROCEDURE — 90686 IIV4 VACC NO PRSV 0.5 ML IM: CPT | Performed by: NURSE PRACTITIONER

## 2017-10-05 RX ORDER — IBUPROFEN 800 MG/1
800 TABLET ORAL EVERY 8 HOURS PRN
COMMUNITY

## 2017-10-05 RX ORDER — TRIAMCINOLONE ACETONIDE 40 MG/ML
40 INJECTION, SUSPENSION INTRA-ARTICULAR; INTRAMUSCULAR ONCE
Status: COMPLETED | OUTPATIENT
Start: 2017-10-05 | End: 2017-10-05

## 2017-10-05 RX ADMIN — TRIAMCINOLONE ACETONIDE 40 MG: 40 INJECTION, SUSPENSION INTRA-ARTICULAR; INTRAMUSCULAR at 17:05

## 2017-10-05 NOTE — ASSESSMENT & PLAN NOTE
Patient reports running nose with clear drainage, frequent sneezing and itching of eyes and nose for 2 months. Symptoms are seasonal. Patient has  difficulty breathing through both nares, and denies history of nasal obstruction, polyps, septal deviation, or mouth breathing. Identified triggers include rabbit brush. She is requesting a Kenalog vaccine.

## 2017-10-06 NOTE — ASSESSMENT & PLAN NOTE
Chronic condition: Patient is treated for hypothyroidism with Levothyroxine 75 µg daily. She  denies heart palpitations, fatigue, weight gain, cold intolerance, depression, dry skin, hair loss, constipation, weakness, headache, lethargy or panic .Labs will be ordered.

## 2017-10-06 NOTE — PROGRESS NOTES
Subjective:     Chief Complaint   Patient presents with   • Injections     Kenalog       HPI:  Drake Blakely is a 52 y.o. female here today to discuss the following:    Allergic rhinitis    Patient reports running nose with clear drainage, frequent sneezing and itching of eyes and nose for 2 months. Symptoms are seasonal. Patient has  difficulty breathing through both nares, and denies history of nasal obstruction, polyps, septal deviation, or mouth breathing. Identified triggers include rabbit brush. She is requesting a Kenalog vaccine.        Hypothyroid  Chronic condition: Patient is treated for hypothyroidism with Levothyroxine 75 µg daily. She  denies heart palpitations, fatigue, weight gain, cold intolerance, depression, dry skin, hair loss, constipation, weakness, headache, lethargy or panic .Labs will be ordered.    Hyperlipidemia  Chronic Condition: Patient has hyperlipidemia and is currently taking Simvastatin. She denies any chest pain, diaphoresis, shortness of breath, headaches, dizziness, blurred vision or myalgias.    HTN (Hypertension)  Chronic condition: Patient is treated for hypertension with Amlodipine 5 mg and blood pressure is well controlled.  She denies any chest pain, diaphoresis, shortness of breath, headaches, dizziness or blurred vision.     Vitamin D deficiency  Patient has a vitamin D Deficiency and takes over-the-counter supplement. She needs a lab order to recheck level.      Current medicines (including changes today)  Current Outpatient Prescriptions   Medication Sig Dispense Refill   • ibuprofen (MOTRIN) 800 MG Tab Take 800 mg by mouth every 8 hours as needed.     • amlodipine (NORVASC) 5 MG Tab Take 1 tablet by mouth  every day 90 Tab 1   • levothyroxine (SYNTHROID) 75 MCG Tab Take 1 tablet by mouth  every morning before  breakfast 90 Tab 0   • escitalopram (LEXAPRO) 10 MG Tab Take 2 Tabs by mouth every bedtime. 180 Tab 0   • simvastatin (ZOCOR) 10 MG Tab Take 1 Tab by mouth  "every evening. 90 Tab 1   • alprazolam (XANAX) 1 MG Tab TAKE 1 TABLET BY MOUTH 3 TIMES DAILY AS NEEDED 40 Tab 0   • zolpidem (AMBIEN) 10 MG Tab TAKE 1 TABLET BY MOUTH AT BEDTIME AS NEEDED FOR INSOMNIA 30 Tab 0   • Garlic 1000 MG CAPS Take  by mouth.     • Coenzyme Q10 (COQ-10) 200 MG CAPS Take  by mouth.     • cetirizine (ZYRTEC) 10 MG TABS Take 10 mg by mouth every bedtime.     • aspirin EC (ECOTRIN) 81 MG TBEC Take 81 mg by mouth every day.     • albuterol 108 (90 BASE) MCG/ACT Aero Soln inhalation aerosol Inhale 2 Puffs by mouth every 6 hours as needed. Shortness of breath 8.5 g 2   • famciclovir (FAMVIR) 500 MG Tab Take 1 Tab by mouth 2 times a day. 60 Tab 5     No current facility-administered medications for this visit.        She  has a past medical history of Allergy; Anxiety; Arthritis; Asthma; Hyperlipidemia; Hypertension; MI (myocardial infarction) (2008); ROHAN (obstructive sleep apnea); Seasonal allergies; and Thyroid disease.    ROS   Review of Systems   Constitutional: Negative for fever, chills, weight loss and malaise/fatigue.   HENT: Negative for ear pain, nosebleeds, sore throat and neck pain.  Positive for rhinorrhea, sneezing, and itching eyes.  Respiratory: Negative for cough, sputum production, shortness of breath and wheezing.    Cardiovascular: Negative for chest pain, palpitations,  and leg swelling.   Gastrointestinal: Negative for heartburn, nausea, vomiting, diarrhea and abdominal pain.   Neurological: Negative for dizziness, tingling, tremors, sensory change, focal weakness and headaches.   Psychiatric/Behavioral: Negative for depression, anxiety, suicidal ideas, insomnia and memory loss.    All other systems reviewed and are negative except as in HPI.     Objective:   Physical Exam:  Blood pressure 130/80, pulse 62, temperature 36.6 °C (97.9 °F), resp. rate 14, height 1.499 m (4' 11\"), weight 72.4 kg (159 lb 9.6 oz), SpO2 100 %. Body mass index is 32.24 kg/m².   Physical Exam:  Alert, " oriented in no acute distress.  Eye contact is good, speech goal directed, affect calm  HEENT: conjunctiva non-injected, sclera non-icteric.  Pinna normal. Ear canals are clear. TMs are within normal limits. Oropharynx is clear with visible clear postnasal drip. Nares  Are patent bilaterally with clear drainage and erythematous turbinates.  Neck No adenopathy or masses in the neck or supraclavicular regions.  Lungs: clear to auscultation bilaterally with good excursion.  CV: regular rate and rhythm.   Abdomen: soft, nontender, No CVAT. Normal bowel sounds.  Ext: no edema, color normal, vascularity normal, temperature normal  MS: Normal gait and station    Assessment and Plan:   The following treatment plan was discussed   1. Allergic rhinitis, unspecified chronicity, unspecified seasonality, unspecified trigger  triamcinolone acetonide (KENALOG-40) injection 40 mg    Kenalog as requested   2. Need for vaccination  Flu Quad Inj >3 Year Pre-Filled PF    Influenza vaccine given today   3. Acquired hypothyroidism  TSH WITH REFLEX TO FT4    Continue current medication Labs ordered.   4. Hyperlipidemia, unspecified hyperlipidemia type  COMP METABOLIC PANEL    LIPID PROFILE    Continue current medication Labs ordered.   5. Vitamin D deficiency  VITAMIN D,25 HYDROXY    Continue current medication Labs ordered.   6. Essential hypertension      Continue current medication        Followup: Return if symptoms worsen or fail to improve, for Pending lab  results.   Please note that this dictation was created using voice recognition software. I have made every reasonable attempt to correct obvious errors, but I expect that there are errors of grammar and possibly content that I did not discover before finalizing the note.

## 2017-10-06 NOTE — ASSESSMENT & PLAN NOTE
Patient has a vitamin D Deficiency and takes over-the-counter supplement. She needs a lab order to recheck level.

## 2017-10-06 NOTE — ASSESSMENT & PLAN NOTE
Chronic condition: Patient is treated for hypertension with Amlodipine 5 mg and blood pressure is well controlled.  She denies any chest pain, diaphoresis, shortness of breath, headaches, dizziness or blurred vision.

## 2017-10-06 NOTE — ASSESSMENT & PLAN NOTE
Chronic Condition: Patient has hyperlipidemia and is currently taking Simvastatin. She denies any chest pain, diaphoresis, shortness of breath, headaches, dizziness, blurred vision or myalgias.

## 2017-10-25 ENCOUNTER — OFFICE VISIT (OUTPATIENT)
Dept: URGENT CARE | Facility: PHYSICIAN GROUP | Age: 52
End: 2017-10-25
Payer: COMMERCIAL

## 2017-10-25 VITALS
DIASTOLIC BLOOD PRESSURE: 80 MMHG | RESPIRATION RATE: 12 BRPM | WEIGHT: 156 LBS | TEMPERATURE: 97.8 F | SYSTOLIC BLOOD PRESSURE: 140 MMHG | HEIGHT: 59 IN | OXYGEN SATURATION: 98 % | BODY MASS INDEX: 31.45 KG/M2 | HEART RATE: 77 BPM

## 2017-10-25 DIAGNOSIS — M54.32 SCIATICA OF LEFT SIDE: ICD-10-CM

## 2017-10-25 PROCEDURE — 99214 OFFICE O/P EST MOD 30 MIN: CPT | Performed by: PHYSICIAN ASSISTANT

## 2017-10-25 RX ORDER — METHYLPREDNISOLONE 4 MG/1
TABLET ORAL
Qty: 1 KIT | Refills: 0 | Status: SHIPPED | OUTPATIENT
Start: 2017-10-25 | End: 2017-12-14

## 2017-10-25 RX ORDER — KETOROLAC TROMETHAMINE 30 MG/ML
60 INJECTION, SOLUTION INTRAMUSCULAR; INTRAVENOUS ONCE
Status: COMPLETED | OUTPATIENT
Start: 2017-10-25 | End: 2017-10-25

## 2017-10-25 RX ADMIN — KETOROLAC TROMETHAMINE 60 MG: 30 INJECTION, SOLUTION INTRAMUSCULAR; INTRAVENOUS at 17:53

## 2017-10-25 ASSESSMENT — ENCOUNTER SYMPTOMS
TINGLING: 1
SORE THROAT: 0
FALLS: 0
PARESIS: 0
PERIANAL NUMBNESS: 0
EYE DISCHARGE: 0
BACK PAIN: 1
LEG PAIN: 1
WHEEZING: 0
EYE REDNESS: 0
BOWEL INCONTINENCE: 0
HEADACHES: 0
PARESTHESIAS: 0
WEAKNESS: 0
CHILLS: 0
FEVER: 0
COUGH: 0
SENSORY CHANGE: 0
NECK PAIN: 0

## 2017-10-26 NOTE — PROGRESS NOTES
"Subjective:      Drake Blakely is a 52 y.o. female who presents with Hip Pain (radiating to knee )            Pt. Is 53 y/o female who presents to . with left sided buttock pain with radiation to her hip/knee/ and sometimes her toe x 4-5 hours. Pt. Reports that she has had this in the past and when she was evaluated she was given a \"shot in the buttock\" and it improve.   She denies any injury, or trauma, leg injury, saddle anesthesias, leg weakness- although she does feel like she might fall as she is having to \"walk strangely\".  Denies any incontinence, fevers, or chills.         Back Pain   This is a new problem. The current episode started today. The problem occurs constantly. The problem is unchanged. The pain is present in the gluteal. The quality of the pain is described as shooting. The pain radiates to the left foot. The pain is at a severity of 5/10. The pain is moderate. The symptoms are aggravated by position. Associated symptoms include leg pain and tingling. Pertinent negatives include no bladder incontinence, bowel incontinence, chest pain, dysuria, fever, headaches, paresis, paresthesias, pelvic pain, perianal numbness or weakness. She has tried NSAIDs for the symptoms. The treatment provided moderate relief.       Review of Systems   Constitutional: Negative for chills, fever and malaise/fatigue.   HENT: Negative for sore throat.    Eyes: Negative for discharge and redness.   Respiratory: Negative for cough and wheezing.    Cardiovascular: Negative for chest pain and leg swelling.   Gastrointestinal: Negative for bowel incontinence.   Genitourinary: Negative for bladder incontinence, dysuria, pelvic pain and urgency.        Denies any new urinary or bowel incontinence   Musculoskeletal: Positive for back pain. Negative for falls and neck pain.        Specifically without leg pain or weakness   Skin: Negative for itching and rash.   Neurological: Positive for tingling. Negative for sensory change, " "weakness, headaches and paresthesias.          Objective:     /80   Pulse 77   Temp 36.6 °C (97.8 °F)   Resp 12   Ht 1.499 m (4' 11\")   Wt 70.8 kg (156 lb)   SpO2 98%   BMI 31.51 kg/m²    PMH:  has a past medical history of Allergy; Anxiety; Arthritis; Asthma; Hyperlipidemia; Hypertension; MI (myocardial infarction) (2008); ROHAN (obstructive sleep apnea); Seasonal allergies; and Thyroid disease.  MEDS:   Current Outpatient Prescriptions:   •  MethylPREDNISolone (MEDROL DOSEPAK) 4 MG Tablet Therapy Pack, UAD, Disp: 1 Kit, Rfl: 0  •  ibuprofen (MOTRIN) 800 MG Tab, Take 800 mg by mouth every 8 hours as needed., Disp: , Rfl:   •  amlodipine (NORVASC) 5 MG Tab, Take 1 tablet by mouth  every day, Disp: 90 Tab, Rfl: 1  •  levothyroxine (SYNTHROID) 75 MCG Tab, Take 1 tablet by mouth  every morning before  breakfast, Disp: 90 Tab, Rfl: 0  •  escitalopram (LEXAPRO) 10 MG Tab, Take 2 Tabs by mouth every bedtime., Disp: 180 Tab, Rfl: 0  •  simvastatin (ZOCOR) 10 MG Tab, Take 1 Tab by mouth every evening., Disp: 90 Tab, Rfl: 1  •  alprazolam (XANAX) 1 MG Tab, TAKE 1 TABLET BY MOUTH 3 TIMES DAILY AS NEEDED, Disp: 40 Tab, Rfl: 0  •  zolpidem (AMBIEN) 10 MG Tab, TAKE 1 TABLET BY MOUTH AT BEDTIME AS NEEDED FOR INSOMNIA, Disp: 30 Tab, Rfl: 0  •  Garlic 1000 MG CAPS, Take  by mouth., Disp: , Rfl:   •  Coenzyme Q10 (COQ-10) 200 MG CAPS, Take  by mouth., Disp: , Rfl:   •  cetirizine (ZYRTEC) 10 MG TABS, Take 10 mg by mouth every bedtime., Disp: , Rfl:   •  aspirin EC (ECOTRIN) 81 MG TBEC, Take 81 mg by mouth every day., Disp: , Rfl:   •  albuterol 108 (90 BASE) MCG/ACT Aero Soln inhalation aerosol, Inhale 2 Puffs by mouth every 6 hours as needed. Shortness of breath, Disp: 8.5 g, Rfl: 2  •  famciclovir (FAMVIR) 500 MG Tab, Take 1 Tab by mouth 2 times a day., Disp: 60 Tab, Rfl: 5    Current Facility-Administered Medications:   •  ketorolac (TORADOL) injection 60 mg, 60 mg, Intramuscular, Once, Dnaiel Lenz, " P.A.-C.  ALLERGIES:   Allergies   Allergen Reactions   • Erythromycin Swelling   • Penicillins Hives, Rash and Swelling   • Valtrex [Valacyclovir] Vomiting     Nausea, vomiting, and abd pain   • Vicodin [Hydrocodone-Acetaminophen] Vomiting   • Morphine Itching   • Naprosyn [Naproxen] Nausea   • Percocet [Oxycodone-Acetaminophen] Vomiting     SURGHX:   Past Surgical History:   Procedure Laterality Date   • HYSTERECTOMY, VAGINAL  2007   • INGUINAL HERNIA REPAIR Left 2007    same time as hysterectomy   • BREAST BIOPSY Right 2005    hx of right benign biopsy-no scar     SOCHX:  reports that she quit smoking about 32 years ago. Her smoking use included Cigarettes. She has a 2.00 pack-year smoking history. She has never used smokeless tobacco. She reports that she drinks about 1.2 oz of alcohol per week . She reports that she uses drugs, including Methamphetamines, Cocaine, and Marijuana.  FH: Family history was reviewed, no pertinent findings to report    Physical Exam   Constitutional: She is oriented to person, place, and time. She appears well-developed and well-nourished. No distress.   HENT:   Head: Normocephalic and atraumatic.   Right Ear: External ear normal.   Left Ear: External ear normal.   Mouth/Throat: Oropharynx is clear and moist. No oropharyngeal exudate.   Eyes: Conjunctivae and EOM are normal. Pupils are equal, round, and reactive to light.   Neck: Normal range of motion. Neck supple. No tracheal deviation present.   Cardiovascular: Normal rate and regular rhythm.    No murmur heard.  Pulmonary/Chest: Effort normal and breath sounds normal. No respiratory distress.   Musculoskeletal: Normal range of motion. She exhibits no edema.        Back:    Noted tenderness along the left sacroiliac notch with reproducible symptoms.    Without midline tenderness, step-off or deformity. Without scoliosis or kyphosis. Without noted paraspinous spasm. FROM with lateral bend, and flexion/extension. Sensation intact  bilaterally, BLE motor 5/5 and symmetrical  strength. Negative Straight leg raise.  Gait- WNL without foot drop.      Neurological: She is alert and oriented to person, place, and time. Coordination normal.   Skin: Skin is warm. No rash noted.   Psychiatric: She has a normal mood and affect. Her behavior is normal. Judgment and thought content normal.   Vitals reviewed.              Assessment/Plan:     1. Sciatica of left side  - ketorolac (TORADOL) injection 60 mg; 2 mL by Intramuscular route Once.  - MethylPREDNISolone (MEDROL DOSEPAK) 4 MG Tablet Therapy Pack; UAD  Dispense: 1 Kit; Refill: 0    Pt. Had significant success with prior injection of Toradol- will trial this at this time- however if not improved after 24 hours she is to start the steroid taper and start Tylenol- avoid NSAID use while on Steroids.   Denies any hx of DMII- or hx of adverse reaction.Prior labs are without prior abnormal A1C or renal insufficiency.   Discussed back red flags and what to return for.   Patient given precautionary s/sx that mandate immediate follow up and evaluation in the ED. Advised of risks of not doing so.    DDX, Supportive care, and indications for immediate follow-up discussed with patient.    Instructed to return to clinic or nearest emergency department if we are not available for any change in condition, further concerns, or worsening of symptoms.    The patient demonstrated a good understanding and agreed with the treatment plan.

## 2017-10-26 NOTE — TELEPHONE ENCOUNTER
Was the patient seen in the last year in this department? Yes     Does patient have an active prescription for medications requested? No     Received Request Via: Pharmacy      Pt met protocol?: Yes Last OV 10/2017  TSH   Date Value Ref Range Status   11/21/2016 0.220 (L) 0.300 - 3.700 uIU/mL Final

## 2017-10-29 RX ORDER — LEVOTHYROXINE SODIUM 0.07 MG/1
TABLET ORAL
Qty: 90 TAB | Refills: 0 | Status: SHIPPED | OUTPATIENT
Start: 2017-10-29 | End: 2018-01-04 | Stop reason: SDUPTHER

## 2017-11-03 RX ORDER — SIMVASTATIN 10 MG
TABLET ORAL
Qty: 90 TAB | Refills: 0 | Status: SHIPPED | OUTPATIENT
Start: 2017-11-03 | End: 2018-01-04 | Stop reason: SDUPTHER

## 2017-11-03 NOTE — TELEPHONE ENCOUNTER
Was the patient seen in the last year in this department? Yes     Does patient have an active prescription for medications requested? No     Received Request Via: Pharmacy      Pt met protocol?: Yes, lipid labs 11/16 ov 10/17

## 2017-12-14 ENCOUNTER — OFFICE VISIT (OUTPATIENT)
Dept: MEDICAL GROUP | Facility: PHYSICIAN GROUP | Age: 52
End: 2017-12-14
Payer: COMMERCIAL

## 2017-12-14 VITALS
HEIGHT: 59 IN | OXYGEN SATURATION: 98 % | WEIGHT: 162 LBS | BODY MASS INDEX: 32.66 KG/M2 | HEART RATE: 78 BPM | RESPIRATION RATE: 16 BRPM | TEMPERATURE: 98.2 F | DIASTOLIC BLOOD PRESSURE: 76 MMHG | SYSTOLIC BLOOD PRESSURE: 126 MMHG

## 2017-12-14 DIAGNOSIS — E03.9 ACQUIRED HYPOTHYROIDISM: ICD-10-CM

## 2017-12-14 DIAGNOSIS — I10 ESSENTIAL HYPERTENSION: ICD-10-CM

## 2017-12-14 DIAGNOSIS — E55.9 VITAMIN D DEFICIENCY: ICD-10-CM

## 2017-12-14 DIAGNOSIS — G47.09 OTHER INSOMNIA: ICD-10-CM

## 2017-12-14 DIAGNOSIS — E78.5 HYPERLIPIDEMIA, UNSPECIFIED HYPERLIPIDEMIA TYPE: ICD-10-CM

## 2017-12-14 PROCEDURE — 99214 OFFICE O/P EST MOD 30 MIN: CPT | Performed by: NURSE PRACTITIONER

## 2017-12-14 RX ORDER — ZOLPIDEM TARTRATE 10 MG/1
TABLET ORAL
Qty: 30 TAB | Refills: 2 | Status: SHIPPED | OUTPATIENT
Start: 2017-12-14 | End: 2018-01-14

## 2017-12-19 NOTE — ASSESSMENT & PLAN NOTE
Chronic condition: Patient has hypothyroidism is treated with levothyroxine 75 µg daily. She denies any heart palpitations, fatigue, weight gain, cold intolerance or constipation. Recent labs reveal a TSH of 0.220. She is feeling well and would like to repeat labs prior to making adjustments to medication.

## 2017-12-19 NOTE — ASSESSMENT & PLAN NOTE
Current condition: Patient has hyperlipidemia is currently taking simvastatin 20 mg daily and cholesterol well controlled. She denies any side effects or myalgias.

## 2017-12-19 NOTE — ASSESSMENT & PLAN NOTE
Patient here to review labs and has a vitamin D deficiency. She currently takes over-the-counter supplement. Labs revealed a vitamin D level of 29. Patient plans to increase dose of OTC supplement. She denies any muscle fatigue or bone pain.

## 2017-12-19 NOTE — PROGRESS NOTES
Subjective:     Chief Complaint   Patient presents with   • Follow-Up     lab results       HPI:  Drake Blakely is a 52 y.o. female here today to discuss the following:    Insomnia  Patient has insomnia and has difficulty falling asleep and staying asleep. She currently takes Ambien 10 mg at bedtime and is requesting refill on prescription. She denies any side effects or daytime somnolence.    Vitamin D deficiency  Patient here to review labs and has a vitamin D deficiency. She currently takes over-the-counter supplement. Labs revealed a vitamin D level of 29. Patient plans to increase dose of OTC supplement. She denies any muscle fatigue or bone pain.    Hypothyroid  Chronic condition: Patient has hypothyroidism is treated with levothyroxine 75 µg daily. She denies any heart palpitations, fatigue, weight gain, cold intolerance or constipation. Recent labs reveal a TSH of 0.220. She is feeling well and would like to repeat labs prior to making adjustments to medication.    Hyperlipidemia  Current condition: Patient has hyperlipidemia is currently taking simvastatin 20 mg daily and cholesterol well controlled. She denies any side effects or myalgias.         Current medicines (including changes today)  Current Outpatient Prescriptions   Medication Sig Dispense Refill   • zolpidem (AMBIEN) 10 MG Tab TAKE 1 TABLET BY MOUTH AT BEDTIME AS NEEDED FOR INSOMNIA 30 Tab 2   • simvastatin (ZOCOR) 10 MG Tab TAKE 1 TABLET BY MOUTH  EVERY EVENING 90 Tab 0   • levothyroxine (SYNTHROID) 75 MCG Tab TAKE 1 TABLET BY MOUTH  EVERY MORNING BEFORE  BREAKFAST 90 Tab 0   • ibuprofen (MOTRIN) 800 MG Tab Take 800 mg by mouth every 8 hours as needed.     • amlodipine (NORVASC) 5 MG Tab Take 1 tablet by mouth  every day 90 Tab 1   • escitalopram (LEXAPRO) 10 MG Tab Take 2 Tabs by mouth every bedtime. 180 Tab 0   • famciclovir (FAMVIR) 500 MG Tab Take 1 Tab by mouth 2 times a day. 60 Tab 5   • alprazolam (XANAX) 1 MG Tab TAKE 1 TABLET BY  "MOUTH 3 TIMES DAILY AS NEEDED 40 Tab 0   • Garlic 1000 MG CAPS Take  by mouth.     • Coenzyme Q10 (COQ-10) 200 MG CAPS Take  by mouth.     • cetirizine (ZYRTEC) 10 MG TABS Take 10 mg by mouth every bedtime.     • aspirin EC (ECOTRIN) 81 MG TBEC Take 81 mg by mouth every day.     • albuterol 108 (90 BASE) MCG/ACT Aero Soln inhalation aerosol Inhale 2 Puffs by mouth every 6 hours as needed. Shortness of breath 8.5 g 2     No current facility-administered medications for this visit.        She  has a past medical history of Allergy; Anxiety; Arthritis; Asthma; Hyperlipidemia; Hypertension; MI (myocardial infarction) (2008); ROHAN (obstructive sleep apnea); Seasonal allergies; and Thyroid disease.    ROS   Review of Systems   Constitutional: Negative for fever, chills, weight loss and malaise/fatigue.   HENT: Negative for ear pain, nosebleeds, congestion, sore throat and neck pain.    Respiratory: Negative for cough, sputum production, shortness of breath and wheezing.    Cardiovascular: Negative for chest pain, palpitations,  and leg swelling.   Gastrointestinal: Negative for heartburn, nausea, vomiting, diarrhea and abdominal pain.   Neurological: Negative for dizziness, tingling, tremors, sensory change, focal weakness and headaches.   Psychiatric/Behavioral: positive for insomnia    All other systems reviewed and are negative except as in HPI.   Objective:   Physical Exam:  Blood pressure 126/76, pulse 78, temperature 36.8 °C (98.2 °F), resp. rate 16, height 1.499 m (4' 11\"), weight 73.5 kg (162 lb), SpO2 98 %. Body mass index is 32.72 kg/m².   General:  Well nourished, well developed in NAD  Head is grossly normal.  Neck: Supple without JVD   Pulmonary:  Normal effort.  Cardiovascular: Regular rate   Extremities: no clubbing, cyanosis, or edema.    Health Maintenance Due   Topic Date Due   • PAP SMEAR  01/10/1986   • COLONOSCOPY  01/10/2015     Assessment and Plan:   The following treatment plan was discussed   1. " Acquired hypothyroidism  TSH WITH REFLEX TO FT4   2. Essential hypertension     3. Vitamin D deficiency     4. Other insomnia  zolpidem (AMBIEN) 10 MG Tab   5. Hyperlipidemia, unspecified hyperlipidemia type       I have reviewed all recent labs with the patient and answered all questions.  Followup: Return in about 3 months (around 3/14/2018) for thyroid, insomnia.   Please note that this dictation was created using voice recognition software. I have made every reasonable attempt to correct obvious errors, but I expect that there are errors of grammar and possibly content that I did not discover before finalizing the note.

## 2018-01-04 RX ORDER — ESCITALOPRAM OXALATE 10 MG/1
20 TABLET ORAL
Qty: 30 TAB | Refills: 0 | Status: SHIPPED | OUTPATIENT
Start: 2018-01-04 | End: 2018-01-05

## 2018-01-13 DIAGNOSIS — J45.20 MILD INTERMITTENT ASTHMA WITHOUT COMPLICATION: ICD-10-CM

## 2018-01-15 RX ORDER — ALBUTEROL SULFATE 90 UG/1
2 AEROSOL, METERED RESPIRATORY (INHALATION) EVERY 6 HOURS PRN
Qty: 18 INHALER | Refills: 2 | Status: SHIPPED | OUTPATIENT
Start: 2018-01-15

## 2018-01-15 NOTE — TELEPHONE ENCOUNTER
Was the patient seen in the last year in this department? Yes     Does patient have an active prescription for medications requested? No     Received Request Via: Pharmacy      Pt met protocol?: Yes    OV 12/17

## 2018-01-18 ENCOUNTER — OFFICE VISIT (OUTPATIENT)
Dept: URGENT CARE | Facility: CLINIC | Age: 53
End: 2018-01-18
Payer: COMMERCIAL

## 2018-01-18 VITALS
HEART RATE: 56 BPM | DIASTOLIC BLOOD PRESSURE: 84 MMHG | OXYGEN SATURATION: 98 % | TEMPERATURE: 97.7 F | SYSTOLIC BLOOD PRESSURE: 126 MMHG | HEIGHT: 59 IN | WEIGHT: 158 LBS | RESPIRATION RATE: 16 BRPM | BODY MASS INDEX: 31.85 KG/M2

## 2018-01-18 DIAGNOSIS — H92.02 OTALGIA OF LEFT EAR: ICD-10-CM

## 2018-01-18 PROCEDURE — 99214 OFFICE O/P EST MOD 30 MIN: CPT | Performed by: PHYSICIAN ASSISTANT

## 2018-01-18 ASSESSMENT — ENCOUNTER SYMPTOMS
COUGH: 0
CHILLS: 0
FEVER: 0
NECK PAIN: 1
DIARRHEA: 0
ABDOMINAL PAIN: 0
SPUTUM PRODUCTION: 0
SHORTNESS OF BREATH: 0
NAUSEA: 0
SORE THROAT: 0
DIZZINESS: 0
VOMITING: 0
RHINORRHEA: 0

## 2018-01-18 NOTE — PROGRESS NOTES
"Subjective:      Drake Blakely is a 53 y.o. female who presents with Otalgia ( x 6 days, Bilateral ear pain, Lt. ear feels worse, dizziness, sneezing, swollen glands and post nasal drip)            Otalgia    There is pain in the left ear. This is a new problem. The current episode started in the past 7 days. The problem occurs constantly. The problem has been unchanged. There has been no fever. The pain is at a severity of 2/10. The pain is mild. Associated symptoms include neck pain. Pertinent negatives include no abdominal pain, coughing, diarrhea, ear discharge, hearing loss, rhinorrhea, sore throat or vomiting. She has tried nothing for the symptoms. There is no history of a chronic ear infection, hearing loss or a tympanostomy tube.     Patient presents to urgent care reporting a 2.5 week history of a viral cold with cough and congestion that has mostly resolved. She was seen via telemedicine and given a 5 day course of Prednisone for an asthma flare, that has also improved. Today she reports intermittent left ear pain and a feeling of \"fullness\" in her left neck region. No ear discharge, tinnitus, decreased hearing, or history of tympanostomy tubes.     Review of Systems   Constitutional: Negative for chills and fever.   HENT: Positive for ear pain. Negative for ear discharge, hearing loss, rhinorrhea and sore throat.    Respiratory: Negative for cough, sputum production and shortness of breath.    Cardiovascular: Negative for chest pain.   Gastrointestinal: Negative for abdominal pain, diarrhea, nausea and vomiting.   Genitourinary: Negative.    Musculoskeletal: Positive for neck pain.   Neurological: Negative for dizziness.        Objective:     /84   Pulse (!) 56   Temp 36.5 °C (97.7 °F)   Resp 16   Ht 1.499 m (4' 11.02\")   Wt 71.7 kg (158 lb)   SpO2 98%   BMI 31.89 kg/m²      Physical Exam   Constitutional: She is oriented to person, place, and time. She appears well-developed and " well-nourished. No distress.   HENT:   Head: Normocephalic and atraumatic.   Right Ear: Hearing, tympanic membrane, external ear and ear canal normal.   Left Ear: Hearing, tympanic membrane, external ear and ear canal normal.   Mouth/Throat: Oropharynx is clear and moist. No oropharyngeal exudate, posterior oropharyngeal edema or posterior oropharyngeal erythema.   Tonsils absent   Eyes: Conjunctivae are normal. Pupils are equal, round, and reactive to light. Right eye exhibits no discharge. Left eye exhibits no discharge.   Neck: Normal range of motion.   Cardiovascular: Normal rate, regular rhythm and normal heart sounds.    No murmur heard.  Pulmonary/Chest: Effort normal and breath sounds normal. No respiratory distress. She has no wheezes. She has no rales.   Musculoskeletal: Normal range of motion.   Lymphadenopathy:     She has no cervical adenopathy.   Neurological: She is alert and oriented to person, place, and time.   Skin: Skin is warm and dry. She is not diaphoretic.   Psychiatric: She has a normal mood and affect. Her behavior is normal.   Nursing note and vitals reviewed.         PMH:  has a past medical history of Allergy; Anxiety; Arthritis; Asthma; Hyperlipidemia; Hypertension; MI (myocardial infarction) (2008); ROHAN (obstructive sleep apnea); Seasonal allergies; and Thyroid disease.  MEDS:   Current Outpatient Prescriptions:   •  VENTOLIN  (90 Base) MCG/ACT Aero Soln inhalation aerosol, INHALE 2 PUFFS BY MOUTH EVERY 6 HOURS AS NEEDED. SHORTNESS OF BREATH, Disp: 18 Inhaler, Rfl: 2  •  amlodipine (NORVASC) 5 MG Tab, TAKE 1 TABLET BY MOUTH  EVERY DAY, Disp: 90 Tab, Rfl: 1  •  levothyroxine (SYNTHROID) 75 MCG Tab, TAKE 1 TABLET BY MOUTH  EVERY MORNING BEFORE  BREAKFAST, Disp: 90 Tab, Rfl: 0  •  escitalopram (LEXAPRO) 10 MG Tab, TAKE 2 TABS BY MOUTH EVERY  BEDTIME., Disp: 180 Tab, Rfl: 1  •  simvastatin (ZOCOR) 10 MG Tab, TAKE 1 TABLET BY MOUTH  EVERY EVENING, Disp: 90 Tab, Rfl: 1  •  ibuprofen  (MOTRIN) 800 MG Tab, Take 800 mg by mouth every 8 hours as needed., Disp: , Rfl:   •  famciclovir (FAMVIR) 500 MG Tab, Take 1 Tab by mouth 2 times a day., Disp: 60 Tab, Rfl: 5  •  alprazolam (XANAX) 1 MG Tab, TAKE 1 TABLET BY MOUTH 3 TIMES DAILY AS NEEDED, Disp: 40 Tab, Rfl: 0  •  Garlic 1000 MG CAPS, Take  by mouth., Disp: , Rfl:   •  Coenzyme Q10 (COQ-10) 200 MG CAPS, Take  by mouth., Disp: , Rfl:   •  cetirizine (ZYRTEC) 10 MG TABS, Take 10 mg by mouth every bedtime., Disp: , Rfl:   •  aspirin EC (ECOTRIN) 81 MG TBEC, Take 81 mg by mouth every day., Disp: , Rfl:   ALLERGIES:   Allergies   Allergen Reactions   • Erythromycin Swelling   • Penicillins Hives, Rash and Swelling   • Tamiflu Vomiting   • Valtrex [Valacyclovir] Vomiting     Nausea, vomiting, and abd pain   • Vicodin [Hydrocodone-Acetaminophen] Vomiting   • Morphine Itching   • Naprosyn [Naproxen] Nausea   • Percocet [Oxycodone-Acetaminophen] Vomiting     SURGHX:   Past Surgical History:   Procedure Laterality Date   • HYSTERECTOMY, VAGINAL  2007   • INGUINAL HERNIA REPAIR Left 2007    same time as hysterectomy   • BREAST BIOPSY Right 2005    hx of right benign biopsy-no scar     SOCHX:  reports that she quit smoking about 32 years ago. Her smoking use included Cigarettes. She has a 2.00 pack-year smoking history. She has never used smokeless tobacco. She reports that she drinks about 1.2 oz of alcohol per week . She reports that she uses drugs, including Methamphetamines, Cocaine, and Marijuana.  FH: family history includes Anxiety disorder in her sister; Asthma in her father and sister; Cancer (age of onset: 57) in her mother; Diabetes in her father; Heart Disease in her father and sister; Hypertension in her father and sister; Lung Disease in her father and sister; Thyroid in her mother.       Assessment/Plan:     1. Otalgia of left ear    No evidence of otitis media or externa at today's visit. Encouraged to take daily decongestant and OTC nsaids as  needed for left ear pain. No submandibular swelling or left cervical lymphadenopathy present on exam. Call or return to office if symptoms persist or worsen. The patient demonstrated a good understanding and agreed with the treatment plan.

## 2018-04-13 RX ORDER — LEVOTHYROXINE SODIUM 0.07 MG/1
75 TABLET ORAL
Qty: 30 TAB | Refills: 0 | Status: SHIPPED | OUTPATIENT
Start: 2018-04-13

## 2018-04-13 NOTE — TELEPHONE ENCOUNTER
*Note on last refill for patient to get labs*  Was the patient seen in the last year in this department? Yes     Does patient have an active prescription for medications requested? No     Received Request Via: Pharmacy    Pt met protocol?: Yes     Last OV 12/2017  TSH   Date Value Ref Range Status   11/21/2016 0.220 (L) 0.300 - 3.700 uIU/mL Final

## 2018-04-13 NOTE — TELEPHONE ENCOUNTER
Pt was told 1/18 to make appt and that has not been done. Please advise pt only 30 days will be sent to pharmacy and next request will be denied.

## 2018-05-08 ENCOUNTER — APPOINTMENT (OUTPATIENT)
Dept: SLEEP MEDICINE | Facility: MEDICAL CENTER | Age: 53
End: 2018-05-08
Payer: COMMERCIAL

## 2019-04-16 NOTE — ASSESSMENT & PLAN NOTE
Patient has insomnia and has difficulty falling asleep and staying asleep. She currently takes Ambien 10 mg at bedtime and is requesting refill on prescription. She denies any side effects or daytime somnolence.   Dermal Autograft Text: The defect edges were debeveled with a #15 scalpel blade.  Given the location of the defect, shape of the defect and the proximity to free margins a dermal autograft was deemed most appropriate.  Using a sterile surgical marker, the primary defect shape was transferred to the donor site. The area thus outlined was incised deep to adipose tissue with a #15 scalpel blade.  The harvested graft was then trimmed of adipose and epidermal tissue until only dermis was left.  The skin graft was then placed in the primary defect and oriented appropriately.

## 2019-11-05 RX ORDER — ESCITALOPRAM OXALATE 10 MG/1
TABLET ORAL
Qty: 180 TAB | Refills: 0 | OUTPATIENT
Start: 2019-11-05

## 2019-11-05 NOTE — TELEPHONE ENCOUNTER
Was the patient seen in the last year in this department? No     Does patient have an active prescription for medications requested? No     Received Request Via: Pharmacy      Pt met protocol?: No    **PT NEEDS APPT WITH PCP

## 2021-03-15 DIAGNOSIS — Z23 NEED FOR VACCINATION: ICD-10-CM

## 2021-03-17 ENCOUNTER — IMMUNIZATION (OUTPATIENT)
Dept: FAMILY PLANNING/WOMEN'S HEALTH CLINIC | Facility: IMMUNIZATION CENTER | Age: 56
End: 2021-03-17
Attending: INTERNAL MEDICINE
Payer: COMMERCIAL

## 2021-03-17 DIAGNOSIS — Z23 NEED FOR VACCINATION: ICD-10-CM

## 2021-03-17 DIAGNOSIS — Z23 ENCOUNTER FOR VACCINATION: Primary | ICD-10-CM

## 2021-03-17 PROCEDURE — 91300 PFIZER SARS-COV-2 VACCINE: CPT

## 2021-03-17 PROCEDURE — 0001A PFIZER SARS-COV-2 VACCINE: CPT

## 2021-04-09 ENCOUNTER — IMMUNIZATION (OUTPATIENT)
Dept: FAMILY PLANNING/WOMEN'S HEALTH CLINIC | Facility: IMMUNIZATION CENTER | Age: 56
End: 2021-04-09
Attending: INTERNAL MEDICINE
Payer: COMMERCIAL

## 2021-04-09 DIAGNOSIS — Z23 ENCOUNTER FOR VACCINATION: Primary | ICD-10-CM

## 2021-04-09 PROCEDURE — 91300 PFIZER SARS-COV-2 VACCINE: CPT

## 2021-04-09 PROCEDURE — 0002A PFIZER SARS-COV-2 VACCINE: CPT

## 2021-05-27 ENCOUNTER — APPOINTMENT (RX ONLY)
Dept: URBAN - METROPOLITAN AREA CLINIC 4 | Facility: CLINIC | Age: 56
Setting detail: DERMATOLOGY
End: 2021-05-27

## 2021-05-27 VITALS — HEIGHT: 59 IN | WEIGHT: 165 LBS

## 2021-05-27 DIAGNOSIS — L81.4 OTHER MELANIN HYPERPIGMENTATION: ICD-10-CM

## 2021-05-27 DIAGNOSIS — D485 NEOPLASM OF UNCERTAIN BEHAVIOR OF SKIN: ICD-10-CM

## 2021-05-27 DIAGNOSIS — D22 MELANOCYTIC NEVI: ICD-10-CM

## 2021-05-27 DIAGNOSIS — L82.1 OTHER SEBORRHEIC KERATOSIS: ICD-10-CM

## 2021-05-27 DIAGNOSIS — D18.0 HEMANGIOMA: ICD-10-CM

## 2021-05-27 PROBLEM — D22.5 MELANOCYTIC NEVI OF TRUNK: Status: ACTIVE | Noted: 2021-05-27

## 2021-05-27 PROBLEM — D22.61 MELANOCYTIC NEVI OF RIGHT UPPER LIMB, INCLUDING SHOULDER: Status: ACTIVE | Noted: 2021-05-27

## 2021-05-27 PROBLEM — D18.01 HEMANGIOMA OF SKIN AND SUBCUTANEOUS TISSUE: Status: ACTIVE | Noted: 2021-05-27

## 2021-05-27 PROBLEM — D22.62 MELANOCYTIC NEVI OF LEFT UPPER LIMB, INCLUDING SHOULDER: Status: ACTIVE | Noted: 2021-05-27

## 2021-05-27 PROBLEM — D48.5 NEOPLASM OF UNCERTAIN BEHAVIOR OF SKIN: Status: ACTIVE | Noted: 2021-05-27

## 2021-05-27 PROBLEM — D22.72 MELANOCYTIC NEVI OF LEFT LOWER LIMB, INCLUDING HIP: Status: ACTIVE | Noted: 2021-05-27

## 2021-05-27 PROBLEM — D22.71 MELANOCYTIC NEVI OF RIGHT LOWER LIMB, INCLUDING HIP: Status: ACTIVE | Noted: 2021-05-27

## 2021-05-27 PROCEDURE — 99203 OFFICE O/P NEW LOW 30 MIN: CPT | Mod: 25

## 2021-05-27 PROCEDURE — ? BIOPSY BY SHAVE METHOD

## 2021-05-27 PROCEDURE — 11102 TANGNTL BX SKIN SINGLE LES: CPT

## 2021-05-27 PROCEDURE — ? COUNSELING

## 2021-05-27 ASSESSMENT — LOCATION DETAILED DESCRIPTION DERM
LOCATION DETAILED: RIGHT ANTECUBITAL SKIN
LOCATION DETAILED: LEFT SUPERIOR MEDIAL MIDBACK
LOCATION DETAILED: LEFT VENTRAL LATERAL PROXIMAL FOREARM
LOCATION DETAILED: RIGHT POPLITEAL SKIN
LOCATION DETAILED: RIGHT PROXIMAL PRETIBIAL REGION
LOCATION DETAILED: RIGHT DISTAL POSTERIOR THIGH
LOCATION DETAILED: LEFT PROXIMAL PRETIBIAL REGION
LOCATION DETAILED: INFERIOR THORACIC SPINE
LOCATION DETAILED: RIGHT INFERIOR CENTRAL MALAR CHEEK
LOCATION DETAILED: LEFT INFERIOR LATERAL MIDBACK
LOCATION DETAILED: LEFT LATERAL DISTAL UPPER ARM
LOCATION DETAILED: XIPHOID
LOCATION DETAILED: RIGHT VENTRAL PROXIMAL FOREARM
LOCATION DETAILED: LEFT BUTTOCK
LOCATION DETAILED: LEFT LATERAL PROXIMAL PRETIBIAL REGION
LOCATION DETAILED: LEFT SUPERIOR PARIETAL SCALP
LOCATION DETAILED: RIGHT ANTERIOR PROXIMAL UPPER ARM
LOCATION DETAILED: LEFT DISTAL POSTERIOR THIGH
LOCATION DETAILED: SUBXIPHOID
LOCATION DETAILED: RIGHT MEDIAL SUPERIOR CHEST
LOCATION DETAILED: RIGHT BUTTOCK
LOCATION DETAILED: LEFT ANTERIOR PROXIMAL UPPER ARM
LOCATION DETAILED: LEFT POPLITEAL SKIN
LOCATION DETAILED: LEFT INFERIOR MEDIAL UPPER BACK
LOCATION DETAILED: LEFT VENTRAL PROXIMAL FOREARM
LOCATION DETAILED: MIDDLE STERNUM
LOCATION DETAILED: LEFT ANTERIOR DISTAL UPPER ARM
LOCATION DETAILED: RIGHT ANTERIOR DISTAL UPPER ARM

## 2021-05-27 ASSESSMENT — LOCATION SIMPLE DESCRIPTION DERM
LOCATION SIMPLE: RIGHT UPPER ARM
LOCATION SIMPLE: LEFT UPPER BACK
LOCATION SIMPLE: LEFT LOWER BACK
LOCATION SIMPLE: SCALP
LOCATION SIMPLE: LEFT POSTERIOR THIGH
LOCATION SIMPLE: LEFT BUTTOCK
LOCATION SIMPLE: LEFT PRETIBIAL REGION
LOCATION SIMPLE: RIGHT FOREARM
LOCATION SIMPLE: RIGHT POSTERIOR THIGH
LOCATION SIMPLE: LEFT POPLITEAL SKIN
LOCATION SIMPLE: RIGHT PRETIBIAL REGION
LOCATION SIMPLE: RIGHT CHEEK
LOCATION SIMPLE: CHEST
LOCATION SIMPLE: LEFT FOREARM
LOCATION SIMPLE: RIGHT POPLITEAL SKIN
LOCATION SIMPLE: LEFT UPPER ARM
LOCATION SIMPLE: UPPER BACK
LOCATION SIMPLE: ABDOMEN
LOCATION SIMPLE: RIGHT BUTTOCK

## 2021-05-27 ASSESSMENT — LOCATION ZONE DERM
LOCATION ZONE: ARM
LOCATION ZONE: FACE
LOCATION ZONE: TRUNK
LOCATION ZONE: SCALP
LOCATION ZONE: LEG

## 2023-08-31 ENCOUNTER — APPOINTMENT (RX ONLY)
Dept: URBAN - METROPOLITAN AREA CLINIC 15 | Facility: CLINIC | Age: 58
Setting detail: DERMATOLOGY
End: 2023-08-31

## 2023-08-31 DIAGNOSIS — D22 MELANOCYTIC NEVI: ICD-10-CM

## 2023-08-31 DIAGNOSIS — L82.1 OTHER SEBORRHEIC KERATOSIS: ICD-10-CM

## 2023-08-31 DIAGNOSIS — L81.4 OTHER MELANIN HYPERPIGMENTATION: ICD-10-CM

## 2023-08-31 DIAGNOSIS — D18.0 HEMANGIOMA: ICD-10-CM

## 2023-08-31 DIAGNOSIS — L85.3 XEROSIS CUTIS: ICD-10-CM

## 2023-08-31 PROBLEM — D22.71 MELANOCYTIC NEVI OF RIGHT LOWER LIMB, INCLUDING HIP: Status: ACTIVE | Noted: 2023-08-31

## 2023-08-31 PROBLEM — D22.61 MELANOCYTIC NEVI OF RIGHT UPPER LIMB, INCLUDING SHOULDER: Status: ACTIVE | Noted: 2023-08-31

## 2023-08-31 PROBLEM — D22.72 MELANOCYTIC NEVI OF LEFT LOWER LIMB, INCLUDING HIP: Status: ACTIVE | Noted: 2023-08-31

## 2023-08-31 PROBLEM — D22.62 MELANOCYTIC NEVI OF LEFT UPPER LIMB, INCLUDING SHOULDER: Status: ACTIVE | Noted: 2023-08-31

## 2023-08-31 PROBLEM — D23.39 OTHER BENIGN NEOPLASM OF SKIN OF OTHER PARTS OF FACE: Status: ACTIVE | Noted: 2023-08-31

## 2023-08-31 PROBLEM — D18.01 HEMANGIOMA OF SKIN AND SUBCUTANEOUS TISSUE: Status: ACTIVE | Noted: 2023-08-31

## 2023-08-31 PROBLEM — D22.5 MELANOCYTIC NEVI OF TRUNK: Status: ACTIVE | Noted: 2023-08-31

## 2023-08-31 PROCEDURE — ? ADDITIONAL NOTES

## 2023-08-31 PROCEDURE — 99213 OFFICE O/P EST LOW 20 MIN: CPT

## 2023-08-31 PROCEDURE — ? COUNSELING

## 2023-08-31 ASSESSMENT — LOCATION SIMPLE DESCRIPTION DERM
LOCATION SIMPLE: RIGHT POSTERIOR THIGH
LOCATION SIMPLE: LEFT ELBOW
LOCATION SIMPLE: LEFT POSTERIOR UPPER ARM
LOCATION SIMPLE: RIGHT UPPER BACK
LOCATION SIMPLE: LEFT INDEX FINGER
LOCATION SIMPLE: RIGHT FOREARM
LOCATION SIMPLE: LEFT PRETIBIAL REGION
LOCATION SIMPLE: RIGHT EYEBROW
LOCATION SIMPLE: RIGHT POPLITEAL SKIN
LOCATION SIMPLE: LEFT POPLITEAL SKIN
LOCATION SIMPLE: RIGHT PRETIBIAL REGION
LOCATION SIMPLE: LEFT POSTERIOR THIGH
LOCATION SIMPLE: UPPER BACK
LOCATION SIMPLE: RIGHT POSTERIOR UPPER ARM
LOCATION SIMPLE: RIGHT RING FINGER
LOCATION SIMPLE: CHEST
LOCATION SIMPLE: ABDOMEN
LOCATION SIMPLE: LEFT FOREARM
LOCATION SIMPLE: RIGHT THIGH

## 2023-08-31 ASSESSMENT — LOCATION DETAILED DESCRIPTION DERM
LOCATION DETAILED: RIGHT SUPERIOR UPPER BACK
LOCATION DETAILED: LEFT PROXIMAL PRETIBIAL REGION
LOCATION DETAILED: RIGHT PROXIMAL POSTERIOR UPPER ARM
LOCATION DETAILED: LEFT LATERAL ELBOW
LOCATION DETAILED: RIGHT POPLITEAL SKIN
LOCATION DETAILED: RIGHT DISTAL PALMAR RING FINGER
LOCATION DETAILED: RIGHT DISTAL POSTERIOR UPPER ARM
LOCATION DETAILED: RIGHT VENTRAL PROXIMAL FOREARM
LOCATION DETAILED: LEFT DISTAL PALMAR INDEX FINGER
LOCATION DETAILED: LEFT POPLITEAL SKIN
LOCATION DETAILED: LOWER STERNUM
LOCATION DETAILED: RIGHT DISTAL POSTERIOR THIGH
LOCATION DETAILED: LEFT DISTAL POSTERIOR THIGH
LOCATION DETAILED: LEFT DISTAL DORSAL FOREARM
LOCATION DETAILED: SUBXIPHOID
LOCATION DETAILED: RIGHT ANTERIOR DISTAL THIGH
LOCATION DETAILED: MIDDLE STERNUM
LOCATION DETAILED: RIGHT LATERAL SUPERIOR CHEST
LOCATION DETAILED: INFERIOR THORACIC SPINE
LOCATION DETAILED: LEFT DISTAL POSTERIOR UPPER ARM
LOCATION DETAILED: RIGHT CENTRAL EYEBROW
LOCATION DETAILED: LEFT VENTRAL LATERAL PROXIMAL FOREARM
LOCATION DETAILED: RIGHT PROXIMAL DORSAL FOREARM
LOCATION DETAILED: RIGHT PROXIMAL PRETIBIAL REGION

## 2023-08-31 ASSESSMENT — LOCATION ZONE DERM
LOCATION ZONE: ARM
LOCATION ZONE: TRUNK
LOCATION ZONE: LEG
LOCATION ZONE: FINGER
LOCATION ZONE: FACE

## 2023-08-31 NOTE — PROCEDURE: ADDITIONAL NOTES
Additional Notes: Recommended Cera Ve, O’Shereen’s hand cream and recommended spa treatment at night.
Detail Level: Detailed
Render Risk Assessment In Note?: no

## 2024-08-30 ENCOUNTER — APPOINTMENT (RX ONLY)
Dept: URBAN - METROPOLITAN AREA CLINIC 15 | Facility: CLINIC | Age: 59
Setting detail: DERMATOLOGY
End: 2024-08-30

## 2024-08-30 DIAGNOSIS — L30.1 DYSHIDROSIS [POMPHOLYX]: ICD-10-CM

## 2024-08-30 DIAGNOSIS — L81.4 OTHER MELANIN HYPERPIGMENTATION: ICD-10-CM

## 2024-08-30 DIAGNOSIS — L82.1 OTHER SEBORRHEIC KERATOSIS: ICD-10-CM

## 2024-08-30 DIAGNOSIS — D18.0 HEMANGIOMA: ICD-10-CM

## 2024-08-30 DIAGNOSIS — D22 MELANOCYTIC NEVI: ICD-10-CM

## 2024-08-30 PROBLEM — D48.5 NEOPLASM OF UNCERTAIN BEHAVIOR OF SKIN: Status: ACTIVE | Noted: 2024-08-30

## 2024-08-30 PROBLEM — D22.72 MELANOCYTIC NEVI OF LEFT LOWER LIMB, INCLUDING HIP: Status: ACTIVE | Noted: 2024-08-30

## 2024-08-30 PROBLEM — D22.5 MELANOCYTIC NEVI OF TRUNK: Status: ACTIVE | Noted: 2024-08-30

## 2024-08-30 PROBLEM — D22.62 MELANOCYTIC NEVI OF LEFT UPPER LIMB, INCLUDING SHOULDER: Status: ACTIVE | Noted: 2024-08-30

## 2024-08-30 PROBLEM — D22.71 MELANOCYTIC NEVI OF RIGHT LOWER LIMB, INCLUDING HIP: Status: ACTIVE | Noted: 2024-08-30

## 2024-08-30 PROBLEM — D18.01 HEMANGIOMA OF SKIN AND SUBCUTANEOUS TISSUE: Status: ACTIVE | Noted: 2024-08-30

## 2024-08-30 PROBLEM — D22.61 MELANOCYTIC NEVI OF RIGHT UPPER LIMB, INCLUDING SHOULDER: Status: ACTIVE | Noted: 2024-08-30

## 2024-08-30 PROCEDURE — ? COUNSELING

## 2024-08-30 PROCEDURE — ? BIOPSY BY SHAVE METHOD

## 2024-08-30 PROCEDURE — 99213 OFFICE O/P EST LOW 20 MIN: CPT | Mod: 25

## 2024-08-30 PROCEDURE — 11102 TANGNTL BX SKIN SINGLE LES: CPT

## 2024-08-30 PROCEDURE — ? PRESCRIPTION

## 2024-08-30 RX ORDER — TRIAMCINOLONE ACETONIDE 1 MG/G
CREAM TOPICAL BID
Qty: 80 | Refills: 1 | Status: ERX | COMMUNITY
Start: 2024-08-30

## 2024-08-30 RX ADMIN — TRIAMCINOLONE ACETONIDE: 1 CREAM TOPICAL at 00:00

## 2024-08-30 ASSESSMENT — LOCATION DETAILED DESCRIPTION DERM
LOCATION DETAILED: LEFT DISTAL POSTERIOR THIGH
LOCATION DETAILED: RIGHT POPLITEAL SKIN
LOCATION DETAILED: MIDDLE STERNUM
LOCATION DETAILED: RIGHT VENTRAL PROXIMAL FOREARM
LOCATION DETAILED: 4TH WEB SPACE LEFT HAND
LOCATION DETAILED: LEFT POPLITEAL SKIN
LOCATION DETAILED: RIGHT ANTERIOR DISTAL THIGH
LOCATION DETAILED: LEFT LATERAL ELBOW
LOCATION DETAILED: RIGHT LATERAL SUPERIOR CHEST
LOCATION DETAILED: LEFT PROXIMAL PRETIBIAL REGION
LOCATION DETAILED: RIGHT PROXIMAL PRETIBIAL REGION
LOCATION DETAILED: LEFT DISTAL POSTERIOR UPPER ARM
LOCATION DETAILED: RIGHT PROXIMAL POSTERIOR UPPER ARM
LOCATION DETAILED: RIGHT DISTAL POSTERIOR UPPER ARM
LOCATION DETAILED: RIGHT CENTRAL EYEBROW
LOCATION DETAILED: LEFT DISTAL DORSAL FOREARM
LOCATION DETAILED: RIGHT SUPERIOR UPPER BACK
LOCATION DETAILED: LOWER STERNUM
LOCATION DETAILED: LEFT VENTRAL LATERAL PROXIMAL FOREARM
LOCATION DETAILED: RIGHT DISTAL POSTERIOR THIGH
LOCATION DETAILED: INFERIOR THORACIC SPINE
LOCATION DETAILED: SUBXIPHOID
LOCATION DETAILED: RIGHT PROXIMAL DORSAL FOREARM
LOCATION DETAILED: RIGHT DORSAL MIDDLE FINGER METACARPOPHALANGEAL JOINT

## 2024-08-30 ASSESSMENT — LOCATION SIMPLE DESCRIPTION DERM
LOCATION SIMPLE: LEFT FOREARM
LOCATION SIMPLE: LEFT HAND
LOCATION SIMPLE: RIGHT POSTERIOR UPPER ARM
LOCATION SIMPLE: RIGHT UPPER BACK
LOCATION SIMPLE: ABDOMEN
LOCATION SIMPLE: RIGHT EYEBROW
LOCATION SIMPLE: RIGHT POSTERIOR THIGH
LOCATION SIMPLE: LEFT POSTERIOR THIGH
LOCATION SIMPLE: RIGHT PRETIBIAL REGION
LOCATION SIMPLE: RIGHT HAND
LOCATION SIMPLE: CHEST
LOCATION SIMPLE: RIGHT THIGH
LOCATION SIMPLE: RIGHT FOREARM
LOCATION SIMPLE: RIGHT POPLITEAL SKIN
LOCATION SIMPLE: LEFT PRETIBIAL REGION
LOCATION SIMPLE: UPPER BACK
LOCATION SIMPLE: LEFT POPLITEAL SKIN
LOCATION SIMPLE: LEFT POSTERIOR UPPER ARM
LOCATION SIMPLE: LEFT ELBOW

## 2024-08-30 ASSESSMENT — LOCATION ZONE DERM
LOCATION ZONE: HAND
LOCATION ZONE: FACE
LOCATION ZONE: TRUNK
LOCATION ZONE: ARM
LOCATION ZONE: LEG

## 2024-08-30 NOTE — PROCEDURE: BIOPSY BY SHAVE METHOD
Detail Level: Detailed
Depth Of Biopsy: dermis
Was A Bandage Applied: Yes
Size Of Lesion In Cm: 0.4
X Size Of Lesion In Cm: 0
Biopsy Type: H and E
Biopsy Method: Personna blade
Anesthesia Type: 1% lidocaine with epinephrine
Anesthesia Volume In Cc: 1
Hemostasis: Electrocautery
Wound Care: Vaseline
Dressing: Band-Aid
Destruction After The Procedure: No
Type Of Destruction Used: Electrodesiccation
Curettage Text: The wound bed was treated with curettage after the biopsy was performed.
Cryotherapy Text: The wound bed was treated with cryotherapy after the biopsy was performed.
Electrodesiccation Text: The wound bed was treated with electrodesiccation after the biopsy was performed.
Electrodesiccation And Curettage Text: The wound bed was treated with electrodesiccation and curettage after the biopsy was performed.
Silver Nitrate Text: The wound bed was treated with silver nitrate after the biopsy was performed.
Lab: [id number]
Lab Facility: 
Path Notes (To The Dermatopathologist): 2 lesions in 1 bottle
Consent: Written consent was obtained and risks were reviewed including but not limited to scarring, infection, bleeding, scabbing, incomplete removal, nerve damage and allergy to anesthesia.
Post-Care Instructions: I reviewed with the patient in detail post-care instructions. Patient is to keep the biopsy site dry overnight, and then apply bacitracin/petroleum  twice daily until healed. Patient may apply hydrogen peroxide soaks to remove any crusting.
Notification Instructions: Patient will be notified of biopsy results. However, patient instructed to call the office if not contacted within 2 weeks.
Billing Type: Third-Party Bill
Information: Selecting Yes will display possible errors in your note based on the variables you have selected. This validation is only offered as a suggestion for you. PLEASE NOTE THAT THE VALIDATION TEXT WILL BE REMOVED WHEN YOU FINALIZE YOUR NOTE. IF YOU WANT TO FAX A PRELIMINARY NOTE YOU WILL NEED TO TOGGLE THIS TO 'NO' IF YOU DO NOT WANT IT IN YOUR FAXED NOTE.

## 2025-08-28 ENCOUNTER — APPOINTMENT (OUTPATIENT)
Dept: URBAN - METROPOLITAN AREA CLINIC 15 | Facility: CLINIC | Age: 60
Setting detail: DERMATOLOGY
End: 2025-08-28

## 2025-08-28 DIAGNOSIS — D18.0 HEMANGIOMA: ICD-10-CM

## 2025-08-28 DIAGNOSIS — L82.1 OTHER SEBORRHEIC KERATOSIS: ICD-10-CM

## 2025-08-28 DIAGNOSIS — L20.89 OTHER ATOPIC DERMATITIS: ICD-10-CM

## 2025-08-28 DIAGNOSIS — L81.4 OTHER MELANIN HYPERPIGMENTATION: ICD-10-CM

## 2025-08-28 DIAGNOSIS — D22 MELANOCYTIC NEVI: ICD-10-CM

## 2025-08-28 DIAGNOSIS — L91.8 OTHER HYPERTROPHIC DISORDERS OF THE SKIN: ICD-10-CM

## 2025-08-28 PROBLEM — D22.61 MELANOCYTIC NEVI OF RIGHT UPPER LIMB, INCLUDING SHOULDER: Status: ACTIVE | Noted: 2025-08-28

## 2025-08-28 PROBLEM — D22.71 MELANOCYTIC NEVI OF RIGHT LOWER LIMB, INCLUDING HIP: Status: ACTIVE | Noted: 2025-08-28

## 2025-08-28 PROBLEM — D22.5 MELANOCYTIC NEVI OF TRUNK: Status: ACTIVE | Noted: 2025-08-28

## 2025-08-28 PROBLEM — D18.01 HEMANGIOMA OF SKIN AND SUBCUTANEOUS TISSUE: Status: ACTIVE | Noted: 2025-08-28

## 2025-08-28 PROBLEM — D23.72 OTHER BENIGN NEOPLASM OF SKIN OF LEFT LOWER LIMB, INCLUDING HIP: Status: ACTIVE | Noted: 2025-08-28

## 2025-08-28 PROBLEM — D22.72 MELANOCYTIC NEVI OF LEFT LOWER LIMB, INCLUDING HIP: Status: ACTIVE | Noted: 2025-08-28

## 2025-08-28 PROBLEM — D22.39 MELANOCYTIC NEVI OF OTHER PARTS OF FACE: Status: ACTIVE | Noted: 2025-08-28

## 2025-08-28 PROBLEM — D22.62 MELANOCYTIC NEVI OF LEFT UPPER LIMB, INCLUDING SHOULDER: Status: ACTIVE | Noted: 2025-08-28

## 2025-08-28 PROCEDURE — ? ADDITIONAL NOTES

## 2025-08-28 PROCEDURE — ? COUNSELING

## 2025-08-28 PROCEDURE — ? LIQUID NITROGEN

## 2025-08-28 ASSESSMENT — LOCATION DETAILED DESCRIPTION DERM
LOCATION DETAILED: RIGHT PROXIMAL DORSAL FOREARM
LOCATION DETAILED: RIGHT INFERIOR ANTERIOR NECK
LOCATION DETAILED: RIGHT POPLITEAL SKIN
LOCATION DETAILED: RIGHT LATERAL SUPERIOR CHEST
LOCATION DETAILED: LEFT DISTAL POSTERIOR UPPER ARM
LOCATION DETAILED: RIGHT SUPERIOR UPPER BACK
LOCATION DETAILED: RIGHT VENTRAL PROXIMAL FOREARM
LOCATION DETAILED: RIGHT MID RADIAL DORSAL SMALL FINGER
LOCATION DETAILED: LOWER STERNUM
LOCATION DETAILED: LEFT CLAVICULAR NECK
LOCATION DETAILED: LEFT POPLITEAL SKIN
LOCATION DETAILED: LEFT PROXIMAL PRETIBIAL REGION
LOCATION DETAILED: LEFT SUPERIOR MEDIAL BUCCAL CHEEK
LOCATION DETAILED: PERIUMBILICAL SKIN
LOCATION DETAILED: LEFT VENTRAL LATERAL PROXIMAL FOREARM
LOCATION DETAILED: RIGHT CLAVICULAR NECK
LOCATION DETAILED: RIGHT PROXIMAL PRETIBIAL REGION
LOCATION DETAILED: LEFT LATERAL ELBOW
LOCATION DETAILED: LEFT INFERIOR LATERAL NECK
LOCATION DETAILED: LEFT INFERIOR FOREHEAD
LOCATION DETAILED: RIGHT CENTRAL EYEBROW
LOCATION DETAILED: SUBXIPHOID
LOCATION DETAILED: RIGHT PROXIMAL POSTERIOR UPPER ARM
LOCATION DETAILED: RIGHT DISTAL POSTERIOR THIGH
LOCATION DETAILED: INFERIOR THORACIC SPINE
LOCATION DETAILED: RIGHT DISTAL POSTERIOR UPPER ARM
LOCATION DETAILED: RIGHT INFERIOR LATERAL NECK
LOCATION DETAILED: RIGHT ANTERIOR DISTAL THIGH
LOCATION DETAILED: LEFT DISTAL DORSAL FOREARM
LOCATION DETAILED: MIDDLE STERNUM
LOCATION DETAILED: LEFT DISTAL POSTERIOR THIGH

## 2025-08-28 ASSESSMENT — LOCATION SIMPLE DESCRIPTION DERM
LOCATION SIMPLE: ABDOMEN
LOCATION SIMPLE: RIGHT EYEBROW
LOCATION SIMPLE: LEFT PRETIBIAL REGION
LOCATION SIMPLE: LEFT POSTERIOR THIGH
LOCATION SIMPLE: RIGHT FOREARM
LOCATION SIMPLE: LEFT POSTERIOR UPPER ARM
LOCATION SIMPLE: RIGHT UPPER BACK
LOCATION SIMPLE: RIGHT POPLITEAL SKIN
LOCATION SIMPLE: LEFT ELBOW
LOCATION SIMPLE: RIGHT ANTERIOR NECK
LOCATION SIMPLE: LEFT FOREARM
LOCATION SIMPLE: RIGHT POSTERIOR UPPER ARM
LOCATION SIMPLE: RIGHT THIGH
LOCATION SIMPLE: UPPER BACK
LOCATION SIMPLE: RIGHT PRETIBIAL REGION
LOCATION SIMPLE: RIGHT POSTERIOR THIGH
LOCATION SIMPLE: CHEST
LOCATION SIMPLE: LEFT POPLITEAL SKIN
LOCATION SIMPLE: LEFT CHEEK
LOCATION SIMPLE: LEFT FOREHEAD
LOCATION SIMPLE: LEFT ANTERIOR NECK
LOCATION SIMPLE: RIGHT SMALL FINGER

## 2025-08-28 ASSESSMENT — LOCATION ZONE DERM
LOCATION ZONE: NECK
LOCATION ZONE: ARM
LOCATION ZONE: FINGER
LOCATION ZONE: TRUNK
LOCATION ZONE: LEG
LOCATION ZONE: FACE